# Patient Record
Sex: MALE | Race: WHITE | NOT HISPANIC OR LATINO | URBAN - METROPOLITAN AREA
[De-identification: names, ages, dates, MRNs, and addresses within clinical notes are randomized per-mention and may not be internally consistent; named-entity substitution may affect disease eponyms.]

---

## 2018-04-27 ENCOUNTER — EMERGENCY (EMERGENCY)
Facility: HOSPITAL | Age: 53
LOS: 1 days | Discharge: HOME | End: 2018-04-27
Attending: EMERGENCY MEDICINE
Payer: SELF-PAY

## 2018-04-27 VITALS — HEIGHT: 69 IN | WEIGHT: 220.02 LBS

## 2018-04-27 VITALS
RESPIRATION RATE: 18 BRPM | OXYGEN SATURATION: 96 % | DIASTOLIC BLOOD PRESSURE: 81 MMHG | SYSTOLIC BLOOD PRESSURE: 125 MMHG | TEMPERATURE: 97 F | HEART RATE: 86 BPM

## 2018-04-27 DIAGNOSIS — Z98.89 OTHER SPECIFIED POSTPROCEDURAL STATES: Chronic | ICD-10-CM

## 2018-04-27 DIAGNOSIS — K46.9 UNSPECIFIED ABDOMINAL HERNIA WITHOUT OBSTRUCTION OR GANGRENE: Chronic | ICD-10-CM

## 2018-04-27 PROCEDURE — 99283 EMERGENCY DEPT VISIT LOW MDM: CPT

## 2018-04-27 PROCEDURE — 73110 X-RAY EXAM OF WRIST: CPT

## 2018-04-27 PROCEDURE — 73110 X-RAY EXAM OF WRIST: CPT | Mod: 26,RT

## 2018-04-27 NOTE — ED ADULT NURSE NOTE - OBJECTIVE STATEMENT
Patient states that he has been having pain in his right arm for the last 10 days. Patient states that he twisted It at work and is still having pain. Patient has no obvious injuries noted

## 2018-04-27 NOTE — ED STATDOCS - CARE PLAN
Principal Discharge DX:	Wrist pain, acute, right  Assessment and plan of treatment:	Apply ice to affected area for 15-20 minutes every few hours for the next few days.  Use as much or as frequently as desired. Tylenol extra strength 2 tablets every 4 hours or Ibuprofen 600mg (3 tablets) every 6 hours as needed for aches, pains.

## 2018-04-27 NOTE — ED STATDOCS - PLAN OF CARE
Apply ice to affected area for 15-20 minutes every few hours for the next few days.  Use as much or as frequently as desired. Tylenol extra strength 2 tablets every 4 hours or Ibuprofen 600mg (3 tablets) every 6 hours as needed for aches, pains.

## 2018-04-27 NOTE — ED STATDOCS - PMH
Afib  2015  Broken ribs  left posterior 9th and 10th  Chronic sinusitis    DDD (degenerative disc disease), lumbar    [place] (hard of hearing), bilateral    Lung nodules  watching  Renal calculus  current  Sleep apnea  not using c-pap  Ulcerative colitis

## 2018-04-27 NOTE — ED ADULT NURSE NOTE - CHPI ED SYMPTOMS NEG
no bruising/no fever/no stiffness/no numbness/no deformity/no difficulty bearing weight/no tingling/no weakness/no abrasion

## 2018-05-18 ENCOUNTER — EMERGENCY (EMERGENCY)
Facility: HOSPITAL | Age: 53
LOS: 1 days | Discharge: HOME | End: 2018-05-18
Attending: EMERGENCY MEDICINE
Payer: COMMERCIAL

## 2018-05-18 VITALS
HEART RATE: 87 BPM | OXYGEN SATURATION: 95 % | RESPIRATION RATE: 20 BRPM | TEMPERATURE: 98 F | SYSTOLIC BLOOD PRESSURE: 119 MMHG | DIASTOLIC BLOOD PRESSURE: 80 MMHG

## 2018-05-18 VITALS — WEIGHT: 220.02 LBS | HEIGHT: 69 IN

## 2018-05-18 DIAGNOSIS — Z98.89 OTHER SPECIFIED POSTPROCEDURAL STATES: Chronic | ICD-10-CM

## 2018-05-18 DIAGNOSIS — K46.9 UNSPECIFIED ABDOMINAL HERNIA WITHOUT OBSTRUCTION OR GANGRENE: Chronic | ICD-10-CM

## 2018-05-18 PROCEDURE — 72100 X-RAY EXAM L-S SPINE 2/3 VWS: CPT

## 2018-05-18 PROCEDURE — 72100 X-RAY EXAM L-S SPINE 2/3 VWS: CPT | Mod: 26

## 2018-05-18 PROCEDURE — 99283 EMERGENCY DEPT VISIT LOW MDM: CPT | Mod: 25

## 2018-05-18 PROCEDURE — 96372 THER/PROPH/DIAG INJ SC/IM: CPT

## 2018-05-18 PROCEDURE — 99283 EMERGENCY DEPT VISIT LOW MDM: CPT

## 2018-05-18 RX ORDER — IBUPROFEN 200 MG
1 TABLET ORAL
Qty: 15 | Refills: 0 | OUTPATIENT
Start: 2018-05-18 | End: 2018-05-22

## 2018-05-18 RX ORDER — CYCLOBENZAPRINE HYDROCHLORIDE 10 MG/1
1 TABLET, FILM COATED ORAL
Qty: 10 | Refills: 0 | OUTPATIENT
Start: 2018-05-18 | End: 2018-05-22

## 2018-05-18 RX ORDER — DEXAMETHASONE 0.5 MG/5ML
10 ELIXIR ORAL ONCE
Qty: 0 | Refills: 0 | Status: COMPLETED | OUTPATIENT
Start: 2018-05-18 | End: 2018-05-18

## 2018-05-18 RX ORDER — IBUPROFEN 200 MG
800 TABLET ORAL ONCE
Qty: 0 | Refills: 0 | Status: COMPLETED | OUTPATIENT
Start: 2018-05-18 | End: 2018-05-18

## 2018-05-18 RX ADMIN — Medication 10 MILLIGRAM(S): at 21:28

## 2018-05-18 RX ADMIN — Medication 800 MILLIGRAM(S): at 21:28

## 2018-05-18 NOTE — ED PROVIDER NOTE - MEDICAL DECISION MAKING DETAILS
51 y/o M p/w lower back pain concerning for disc herniation with sciatica. Will give NSAIDs, steroids and re-eval.

## 2018-05-18 NOTE — ED PROVIDER NOTE - PMH
Afib  2015  Broken ribs  left posterior 9th and 10th  Chronic sinusitis    DDD (degenerative disc disease), lumbar    [place] (hard of hearing), bilateral    Lung nodules  watching  Renal calculus  current  Sleep apnea  not using c-pap  Ulcerative colitis

## 2018-05-18 NOTE — ED PROVIDER NOTE - OBJECTIVE STATEMENT
51 y/o M w/ PMHx of Afib, UC, and OA presents to ED c/o R lower back pain with mild radiation to the RLE x1 month. Associated numbness to the RLE. Denies fall/injury/trauma, urinary incontinence, bowel incontinence, urinary sx, fever, chills, motor/sensory loss or any other complaints at this time.

## 2022-03-16 NOTE — ED ADULT TRIAGE NOTE - HEIGHT IN FEET
5 Star Wedge Flap Text: The defect edges were debeveled with a #15 scalpel blade.  Given the location of the defect, shape of the defect and the proximity to free margins a star wedge flap was deemed most appropriate.  Using a sterile surgical marker, an appropriate rotation flap was drawn incorporating the defect and placing the expected incisions within the relaxed skin tension lines where possible. The area thus outlined was incised deep to adipose tissue with a #15 scalpel blade.  The skin margins were undermined to an appropriate distance in all directions utilizing iris scissors.

## 2023-11-29 ENCOUNTER — OFFICE (OUTPATIENT)
Dept: URBAN - METROPOLITAN AREA CLINIC 113 | Facility: CLINIC | Age: 58
Setting detail: OPHTHALMOLOGY
End: 2023-11-29
Payer: COMMERCIAL

## 2023-11-29 DIAGNOSIS — H40.013: ICD-10-CM

## 2023-11-29 PROCEDURE — 92250 FUNDUS PHOTOGRAPHY W/I&R: CPT | Performed by: STUDENT IN AN ORGANIZED HEALTH CARE EDUCATION/TRAINING PROGRAM

## 2023-11-29 PROCEDURE — 92004 COMPRE OPH EXAM NEW PT 1/>: CPT | Performed by: STUDENT IN AN ORGANIZED HEALTH CARE EDUCATION/TRAINING PROGRAM

## 2023-11-29 ASSESSMENT — REFRACTION_CURRENTRX
OS_AXIS: 147
OS_ADD: +2.75
OS_SPHERE: +1.00
OD_AXIS: 061
OD_VPRISM_DIRECTION: BF
OS_OVR_VA: 20/
OS_VPRISM_DIRECTION: BF
OD_OVR_VA: 20/
OD_SPHERE: +0.75
OD_ADD: +2.75
OS_CYLINDER: -0.50
OD_CYLINDER: -0.75

## 2023-11-29 ASSESSMENT — REFRACTION_AUTOREFRACTION
OS_SPHERE: +0.50
OD_SPHERE: +0.50
OS_AXIS: 000
OD_AXIS: 027
OS_CYLINDER: SPHERE
OD_CYLINDER: -0.75

## 2023-11-29 ASSESSMENT — CONFRONTATIONAL VISUAL FIELD TEST (CVF)
OS_FINDINGS: FULL
OD_FINDINGS: FULL

## 2023-11-29 ASSESSMENT — SPHEQUIV_DERIVED: OD_SPHEQUIV: 0.125

## 2024-01-17 ENCOUNTER — OFFICE (OUTPATIENT)
Dept: URBAN - METROPOLITAN AREA CLINIC 113 | Facility: CLINIC | Age: 59
Setting detail: OPHTHALMOLOGY
End: 2024-01-17
Payer: COMMERCIAL

## 2024-01-17 DIAGNOSIS — H40.013: ICD-10-CM

## 2024-01-17 PROCEDURE — 92083 EXTENDED VISUAL FIELD XM: CPT | Performed by: STUDENT IN AN ORGANIZED HEALTH CARE EDUCATION/TRAINING PROGRAM

## 2024-01-17 PROCEDURE — 92020 GONIOSCOPY: CPT | Performed by: STUDENT IN AN ORGANIZED HEALTH CARE EDUCATION/TRAINING PROGRAM

## 2024-01-17 PROCEDURE — 92133 CPTRZD OPH DX IMG PST SGM ON: CPT | Performed by: STUDENT IN AN ORGANIZED HEALTH CARE EDUCATION/TRAINING PROGRAM

## 2024-01-17 PROCEDURE — 99213 OFFICE O/P EST LOW 20 MIN: CPT | Performed by: STUDENT IN AN ORGANIZED HEALTH CARE EDUCATION/TRAINING PROGRAM

## 2024-01-17 ASSESSMENT — REFRACTION_CURRENTRX
OD_VPRISM_DIRECTION: BF
OD_SPHERE: +0.75
OD_AXIS: 061
OS_AXIS: 147
OS_SPHERE: +1.00
OS_OVR_VA: 20/
OD_ADD: +2.75
OS_VPRISM_DIRECTION: BF
OS_ADD: +2.75
OD_CYLINDER: -0.75
OS_CYLINDER: -0.50
OD_OVR_VA: 20/

## 2024-01-17 ASSESSMENT — SPHEQUIV_DERIVED
OS_SPHEQUIV: 0.625
OD_SPHEQUIV: 0.375

## 2024-01-17 ASSESSMENT — REFRACTION_AUTOREFRACTION
OS_SPHERE: +0.75
OS_AXIS: 027
OD_AXIS: 034
OD_SPHERE: +0.75
OD_CYLINDER: -0.75
OS_CYLINDER: -0.25

## 2024-01-17 ASSESSMENT — CONFRONTATIONAL VISUAL FIELD TEST (CVF)
OS_FINDINGS: FULL
OD_FINDINGS: FULL

## 2024-04-01 ENCOUNTER — APPOINTMENT (OUTPATIENT)
Dept: GASTROENTEROLOGY | Facility: CLINIC | Age: 59
End: 2024-04-01
Payer: MEDICARE

## 2024-04-01 VITALS
BODY MASS INDEX: 35.63 KG/M2 | HEIGHT: 67 IN | TEMPERATURE: 97.5 F | WEIGHT: 227 LBS | HEART RATE: 95 BPM | OXYGEN SATURATION: 99 % | DIASTOLIC BLOOD PRESSURE: 92 MMHG | SYSTOLIC BLOOD PRESSURE: 131 MMHG | RESPIRATION RATE: 14 BRPM

## 2024-04-01 DIAGNOSIS — Z78.9 OTHER SPECIFIED HEALTH STATUS: ICD-10-CM

## 2024-04-01 DIAGNOSIS — Z86.79 PERSONAL HISTORY OF OTHER DISEASES OF THE CIRCULATORY SYSTEM: ICD-10-CM

## 2024-04-01 DIAGNOSIS — R73.03 PREDIABETES.: ICD-10-CM

## 2024-04-01 PROCEDURE — 99204 OFFICE O/P NEW MOD 45 MIN: CPT

## 2024-04-01 RX ORDER — MESALAMINE 500 MG/1
500 CAPSULE, EXTENDED RELEASE ORAL
Refills: 0 | Status: ACTIVE | COMMUNITY

## 2024-04-01 RX ORDER — TORSEMIDE 20 MG/1
20 TABLET ORAL
Refills: 0 | Status: ACTIVE | COMMUNITY

## 2024-04-01 RX ORDER — ROSUVASTATIN CALCIUM 5 MG/1
TABLET, FILM COATED ORAL
Refills: 0 | Status: ACTIVE | COMMUNITY

## 2024-04-01 RX ORDER — OMEPRAZOLE 40 MG/1
CAPSULE, DELAYED RELEASE ORAL
Refills: 0 | Status: ACTIVE | COMMUNITY

## 2024-04-01 RX ORDER — DILTIAZEM HYDROCHLORIDE 240 MG/1
240 CAPSULE, EXTENDED RELEASE ORAL
Refills: 0 | Status: ACTIVE | COMMUNITY

## 2024-04-01 RX ORDER — PREDNISONE 5 MG/1
5 TABLET ORAL
Refills: 0 | Status: ACTIVE | COMMUNITY

## 2024-04-01 RX ORDER — NITROFURANTOIN (MONOHYDRATE/MACROCRYSTALS) 25; 75 MG/1; MG/1
100 CAPSULE ORAL
Refills: 0 | Status: ACTIVE | COMMUNITY

## 2024-04-01 RX ORDER — TAMSULOSIN HYDROCHLORIDE 0.4 MG/1
0.4 CAPSULE ORAL
Refills: 0 | Status: ACTIVE | COMMUNITY

## 2024-04-01 RX ORDER — MONTELUKAST SODIUM 10 MG/1
10 TABLET, FILM COATED ORAL
Refills: 0 | Status: ACTIVE | COMMUNITY

## 2024-04-01 RX ORDER — CYANOCOBALAMIN (VITAMIN B-12) 1000 MCG
TABLET ORAL
Refills: 0 | Status: ACTIVE | COMMUNITY

## 2024-04-01 RX ORDER — DOXYCYCLINE HYCLATE 100 MG/1
100 CAPSULE ORAL
Refills: 0 | Status: ACTIVE | COMMUNITY

## 2024-04-01 RX ORDER — APIXABAN 5 MG/1
5 TABLET, FILM COATED ORAL
Refills: 0 | Status: ACTIVE | COMMUNITY

## 2024-04-01 RX ORDER — DIGOXIN 125 UG/1
125 TABLET ORAL
Refills: 0 | Status: ACTIVE | COMMUNITY

## 2024-04-01 RX ORDER — MIDODRINE HYDROCHLORIDE 5 MG/1
5 TABLET ORAL
Refills: 0 | Status: ACTIVE | COMMUNITY

## 2024-04-01 NOTE — PHYSICAL EXAM
[Alert] : alert [Normal Voice/Communication] : normal voice/communication [Healthy Appearing] : healthy appearing [No Acute Distress] : no acute distress [Sclera] : the sclera and conjunctiva were normal [Hearing Threshold Finger Rub Not [place]] : hearing was normal [Normal Lips/Gums] : the lips and gums were normal [Normal Appearance] : the appearance of the neck was normal [Oropharynx] : the oropharynx was normal [No Neck Mass] : no neck mass was observed [No Respiratory Distress] : no respiratory distress [No Acc Muscle Use] : no accessory muscle use [Respiration, Rhythm And Depth] : normal respiratory rhythm and effort [Heart Rate And Rhythm] : heart rate was normal and rhythm regular [Auscultation Breath Sounds / Voice Sounds] : lungs were clear to auscultation bilaterally [Normal S1, S2] : normal S1 and S2 [Murmurs] : no murmurs [Bowel Sounds] : normal bowel sounds [No Masses] : no abdominal mass palpated [Abdomen Tenderness] : non-tender [Abdomen Soft] : soft [] : no hepatosplenomegaly [Oriented To Time, Place, And Person] : oriented to person, place, and time

## 2024-04-01 NOTE — HISTORY OF PRESENT ILLNESS
[FreeTextEntry1] : The patient was referred by Dr. Elias.  The patient has undergone a CT abdomen and pelvis due to hematuria.  The patient has noted to have a pancreatic tail lesion about 1 cm in size.  Also noted to have a large calculus in the urinary bladder.  Patient has history of ulcerative colitis as well.  He has been on steroids. He is getting ablation performed by his cardiologist. Bipin lerma.  No smoking. No pancreatic cancer in family. No pancreatitis. No weight loss. No nausea, vomiting or abdominal pain.

## 2024-04-01 NOTE — ASSESSMENT
[FreeTextEntry1] : As the patient is currently undergoing cardiac ablation for underlying A-fib.  I will review his imaging with our radiology team and based on that we will decide the patient will need an urgent EUS on March.  In the meanwhile we will tentatively schedule him for the procedure pending his cardiology clearance.  Blood work was ordered.  Angel Luis Daniels MD Gastroenterology

## 2024-04-24 ENCOUNTER — RESULT REVIEW (OUTPATIENT)
Age: 59
End: 2024-04-24

## 2024-04-24 ENCOUNTER — APPOINTMENT (OUTPATIENT)
Dept: NUCLEAR MEDICINE | Facility: CLINIC | Age: 59
End: 2024-04-24
Payer: MEDICARE

## 2024-04-24 PROCEDURE — A9592: CPT

## 2024-04-24 PROCEDURE — 78815 PET IMAGE W/CT SKULL-THIGH: CPT | Mod: PI

## 2024-05-06 ENCOUNTER — LABORATORY RESULT (OUTPATIENT)
Age: 59
End: 2024-05-06

## 2024-05-13 LAB
ALBUMIN SERPL ELPH-MCNC: 4.1 G/DL
ALP BLD-CCNC: 74 U/L
ALT SERPL-CCNC: 16 U/L
ANION GAP SERPL CALC-SCNC: 12 MMOL/L
AST SERPL-CCNC: 19 U/L
BASOPHILS # BLD AUTO: 0.16 K/UL
BASOPHILS NFR BLD AUTO: 1.6 %
BILIRUB SERPL-MCNC: 0.4 MG/DL
BUN SERPL-MCNC: 9 MG/DL
CALCIUM SERPL-MCNC: 9.7 MG/DL
CANCER AG19-9 SERPL-ACNC: 6 U/ML
CEA SERPL-MCNC: 1.1 NG/ML
CGA SERPL-MCNC: 81.5 NG/ML
CHLORIDE SERPL-SCNC: 105 MMOL/L
CO2 SERPL-SCNC: 24 MMOL/L
CREAT SERPL-MCNC: 0.95 MG/DL
EGFR: 92 ML/MIN/1.73M2
EOSINOPHIL # BLD AUTO: 1.62 K/UL
EOSINOPHIL NFR BLD AUTO: 16.4 %
GLUCOSE SERPL-MCNC: 98 MG/DL
HCT VFR BLD CALC: 38.9 %
HGB BLD-MCNC: 11.9 G/DL
IMM GRANULOCYTES NFR BLD AUTO: 0.4 %
LYMPHOCYTES # BLD AUTO: 2.65 K/UL
LYMPHOCYTES NFR BLD AUTO: 26.8 %
MAN DIFF?: NORMAL
MCHC RBC-ENTMCNC: 27.9 PG
MCHC RBC-ENTMCNC: 30.6 GM/DL
MCV RBC AUTO: 91.1 FL
MONOCYTES # BLD AUTO: 1.76 K/UL
MONOCYTES NFR BLD AUTO: 17.8 %
NEUTROPHILS # BLD AUTO: 3.66 K/UL
NEUTROPHILS NFR BLD AUTO: 37 %
PLATELET # BLD AUTO: 441 K/UL
POTASSIUM SERPL-SCNC: 4.3 MMOL/L
PROT SERPL-MCNC: 7.3 G/DL
RBC # BLD: 4.27 M/UL
RBC # FLD: 17.1 %
SODIUM SERPL-SCNC: 142 MMOL/L
WBC # FLD AUTO: 9.89 K/UL

## 2024-06-05 ENCOUNTER — TRANSCRIPTION ENCOUNTER (OUTPATIENT)
Age: 59
End: 2024-06-05

## 2024-06-06 ENCOUNTER — APPOINTMENT (OUTPATIENT)
Dept: GASTROENTEROLOGY | Facility: HOSPITAL | Age: 59
End: 2024-06-06

## 2024-06-06 ENCOUNTER — OUTPATIENT (OUTPATIENT)
Dept: OUTPATIENT SERVICES | Facility: HOSPITAL | Age: 59
LOS: 1 days | End: 2024-06-06
Payer: MEDICARE

## 2024-06-06 ENCOUNTER — RESULT REVIEW (OUTPATIENT)
Age: 59
End: 2024-06-06

## 2024-06-06 DIAGNOSIS — K86.9 DISEASE OF PANCREAS, UNSPECIFIED: ICD-10-CM

## 2024-06-06 DIAGNOSIS — K46.9 UNSPECIFIED ABDOMINAL HERNIA WITHOUT OBSTRUCTION OR GANGRENE: Chronic | ICD-10-CM

## 2024-06-06 DIAGNOSIS — Z98.89 OTHER SPECIFIED POSTPROCEDURAL STATES: Chronic | ICD-10-CM

## 2024-06-06 PROCEDURE — 43242 EGD US FINE NEEDLE BX/ASPIR: CPT

## 2024-06-06 PROCEDURE — C9399: CPT

## 2024-06-06 PROCEDURE — 88342 IMHCHEM/IMCYTCHM 1ST ANTB: CPT | Mod: 26

## 2024-06-06 PROCEDURE — 43239 EGD BIOPSY SINGLE/MULTIPLE: CPT | Mod: XS

## 2024-06-06 PROCEDURE — 43239 EGD BIOPSY SINGLE/MULTIPLE: CPT | Mod: 59

## 2024-06-06 PROCEDURE — 88305 TISSUE EXAM BY PATHOLOGIST: CPT

## 2024-06-06 PROCEDURE — 88342 IMHCHEM/IMCYTCHM 1ST ANTB: CPT

## 2024-06-06 PROCEDURE — 88173 CYTOPATH EVAL FNA REPORT: CPT | Mod: 26

## 2024-06-06 PROCEDURE — 88305 TISSUE EXAM BY PATHOLOGIST: CPT | Mod: 26

## 2024-06-06 NOTE — PHYSICAL EXAM
[Alert] : alert [Normal Voice/Communication] : normal voice/communication [Healthy Appearing] : healthy appearing [No Acute Distress] : no acute distress [Sclera] : the sclera and conjunctiva were normal [Hearing Threshold Finger Rub Not [place]] : hearing was normal [Normal Lips/Gums] : the lips and gums were normal [Oropharynx] : the oropharynx was normal [Normal Appearance] : the appearance of the neck was normal [No Neck Mass] : no neck mass was observed [No Respiratory Distress] : no respiratory distress [No Acc Muscle Use] : no accessory muscle use [Respiration, Rhythm And Depth] : normal respiratory rhythm and effort [Auscultation Breath Sounds / Voice Sounds] : lungs were clear to auscultation bilaterally [Heart Rate And Rhythm] : heart rate was normal and rhythm regular [Normal S1, S2] : normal S1 and S2 [Murmurs] : no murmurs [Bowel Sounds] : normal bowel sounds [Abdomen Tenderness] : non-tender [No Masses] : no abdominal mass palpated [Abdomen Soft] : soft [] : no hepatosplenomegaly [Oriented To Time, Place, And Person] : oriented to person, place, and time

## 2024-06-06 NOTE — HISTORY OF PRESENT ILLNESS
[FreeTextEntry1] : Patient has arrived for EGD.EUS. He has questionable pancreatic tail lesion. On dotate PET scan, there is uptake in the tail area. MRI was equivocal. Cardiology clearance was obtained.

## 2024-06-10 LAB — SURGICAL PATHOLOGY STUDY: SIGNIFICANT CHANGE UP

## 2024-06-11 LAB — NON-GYNECOLOGICAL CYTOLOGY STUDY: SIGNIFICANT CHANGE UP

## 2024-06-29 ENCOUNTER — EMERGENCY (EMERGENCY)
Facility: HOSPITAL | Age: 59
LOS: 1 days | Discharge: HOME | End: 2024-06-29
Attending: EMERGENCY MEDICINE
Payer: MEDICARE

## 2024-06-29 VITALS
SYSTOLIC BLOOD PRESSURE: 126 MMHG | OXYGEN SATURATION: 95 % | TEMPERATURE: 98 F | WEIGHT: 209.88 LBS | HEART RATE: 94 BPM | RESPIRATION RATE: 18 BRPM | DIASTOLIC BLOOD PRESSURE: 83 MMHG

## 2024-06-29 VITALS
HEART RATE: 96 BPM | OXYGEN SATURATION: 95 % | SYSTOLIC BLOOD PRESSURE: 121 MMHG | RESPIRATION RATE: 18 BRPM | DIASTOLIC BLOOD PRESSURE: 81 MMHG

## 2024-06-29 DIAGNOSIS — K46.9 UNSPECIFIED ABDOMINAL HERNIA WITHOUT OBSTRUCTION OR GANGRENE: Chronic | ICD-10-CM

## 2024-06-29 DIAGNOSIS — Z98.89 OTHER SPECIFIED POSTPROCEDURAL STATES: Chronic | ICD-10-CM

## 2024-06-29 LAB
ALBUMIN SERPL ELPH-MCNC: 4.1 G/DL — SIGNIFICANT CHANGE UP (ref 3.3–5.2)
ALP SERPL-CCNC: 77 U/L — SIGNIFICANT CHANGE UP (ref 40–120)
ALT FLD-CCNC: 15 U/L — SIGNIFICANT CHANGE UP
ANION GAP SERPL CALC-SCNC: 11 MMOL/L — SIGNIFICANT CHANGE UP (ref 5–17)
APPEARANCE UR: ABNORMAL
AST SERPL-CCNC: 19 U/L — SIGNIFICANT CHANGE UP
BACTERIA # UR AUTO: NEGATIVE /HPF — SIGNIFICANT CHANGE UP
BASOPHILS # BLD AUTO: 0.08 K/UL — SIGNIFICANT CHANGE UP (ref 0–0.2)
BASOPHILS NFR BLD AUTO: 0.5 % — SIGNIFICANT CHANGE UP (ref 0–2)
BILIRUB SERPL-MCNC: 0.7 MG/DL — SIGNIFICANT CHANGE UP (ref 0.4–2)
BILIRUB UR-MCNC: NEGATIVE — SIGNIFICANT CHANGE UP
BUN SERPL-MCNC: 20.4 MG/DL — HIGH (ref 8–20)
CALCIUM SERPL-MCNC: 9.3 MG/DL — SIGNIFICANT CHANGE UP (ref 8.4–10.5)
CAST: 1 /LPF — SIGNIFICANT CHANGE UP (ref 0–4)
CHLORIDE SERPL-SCNC: 91 MMOL/L — LOW (ref 96–108)
CO2 SERPL-SCNC: 34 MMOL/L — HIGH (ref 22–29)
COLOR SPEC: YELLOW — SIGNIFICANT CHANGE UP
CREAT SERPL-MCNC: 0.87 MG/DL — SIGNIFICANT CHANGE UP (ref 0.5–1.3)
DIFF PNL FLD: ABNORMAL
EGFR: 99 ML/MIN/1.73M2 — SIGNIFICANT CHANGE UP
EOSINOPHIL # BLD AUTO: 0.69 K/UL — HIGH (ref 0–0.5)
EOSINOPHIL NFR BLD AUTO: 4.6 % — SIGNIFICANT CHANGE UP (ref 0–6)
GLUCOSE SERPL-MCNC: 130 MG/DL — HIGH (ref 70–99)
GLUCOSE UR QL: NEGATIVE MG/DL — SIGNIFICANT CHANGE UP
HCT VFR BLD CALC: 45.6 % — SIGNIFICANT CHANGE UP (ref 39–50)
HGB BLD-MCNC: 15 G/DL — SIGNIFICANT CHANGE UP (ref 13–17)
IMM GRANULOCYTES NFR BLD AUTO: 0.3 % — SIGNIFICANT CHANGE UP (ref 0–0.9)
KETONES UR-MCNC: NEGATIVE MG/DL — SIGNIFICANT CHANGE UP
LEUKOCYTE ESTERASE UR-ACNC: ABNORMAL
LYMPHOCYTES # BLD AUTO: 13.5 % — SIGNIFICANT CHANGE UP (ref 13–44)
LYMPHOCYTES # BLD AUTO: 2.04 K/UL — SIGNIFICANT CHANGE UP (ref 1–3.3)
MCHC RBC-ENTMCNC: 28.5 PG — SIGNIFICANT CHANGE UP (ref 27–34)
MCHC RBC-ENTMCNC: 32.9 GM/DL — SIGNIFICANT CHANGE UP (ref 32–36)
MCV RBC AUTO: 86.5 FL — SIGNIFICANT CHANGE UP (ref 80–100)
MONOCYTES # BLD AUTO: 1.94 K/UL — HIGH (ref 0–0.9)
MONOCYTES NFR BLD AUTO: 12.8 % — SIGNIFICANT CHANGE UP (ref 2–14)
NEUTROPHILS # BLD AUTO: 10.36 K/UL — HIGH (ref 1.8–7.4)
NEUTROPHILS NFR BLD AUTO: 68.3 % — SIGNIFICANT CHANGE UP (ref 43–77)
NITRITE UR-MCNC: NEGATIVE — SIGNIFICANT CHANGE UP
PH UR: 7 — SIGNIFICANT CHANGE UP (ref 5–8)
PLATELET # BLD AUTO: 450 K/UL — HIGH (ref 150–400)
POTASSIUM SERPL-MCNC: 2.9 MMOL/L — CRITICAL LOW (ref 3.5–5.3)
POTASSIUM SERPL-SCNC: 2.9 MMOL/L — CRITICAL LOW (ref 3.5–5.3)
PROT SERPL-MCNC: 8.4 G/DL — SIGNIFICANT CHANGE UP (ref 6.6–8.7)
PROT UR-MCNC: 100 MG/DL
RBC # BLD: 5.27 M/UL — SIGNIFICANT CHANGE UP (ref 4.2–5.8)
RBC # FLD: 15.8 % — HIGH (ref 10.3–14.5)
RBC CASTS # UR COMP ASSIST: 81 /HPF — HIGH (ref 0–4)
SODIUM SERPL-SCNC: 136 MMOL/L — SIGNIFICANT CHANGE UP (ref 135–145)
SP GR SPEC: 1.01 — SIGNIFICANT CHANGE UP (ref 1–1.03)
SQUAMOUS # UR AUTO: 1 /HPF — SIGNIFICANT CHANGE UP (ref 0–5)
UROBILINOGEN FLD QL: 0.2 MG/DL — SIGNIFICANT CHANGE UP (ref 0.2–1)
WBC # BLD: 15.16 K/UL — HIGH (ref 3.8–10.5)
WBC # FLD AUTO: 15.16 K/UL — HIGH (ref 3.8–10.5)
WBC UR QL: 194 /HPF — HIGH (ref 0–5)

## 2024-06-29 PROCEDURE — 80053 COMPREHEN METABOLIC PANEL: CPT

## 2024-06-29 PROCEDURE — 93010 ELECTROCARDIOGRAM REPORT: CPT

## 2024-06-29 PROCEDURE — 36415 COLL VENOUS BLD VENIPUNCTURE: CPT

## 2024-06-29 PROCEDURE — 96374 THER/PROPH/DIAG INJ IV PUSH: CPT

## 2024-06-29 PROCEDURE — 99284 EMERGENCY DEPT VISIT MOD MDM: CPT

## 2024-06-29 PROCEDURE — 85025 COMPLETE CBC W/AUTO DIFF WBC: CPT

## 2024-06-29 PROCEDURE — 81001 URINALYSIS AUTO W/SCOPE: CPT

## 2024-06-29 PROCEDURE — 87086 URINE CULTURE/COLONY COUNT: CPT

## 2024-06-29 PROCEDURE — 96375 TX/PRO/DX INJ NEW DRUG ADDON: CPT

## 2024-06-29 PROCEDURE — 99284 EMERGENCY DEPT VISIT MOD MDM: CPT | Mod: 25

## 2024-06-29 PROCEDURE — 93005 ELECTROCARDIOGRAM TRACING: CPT

## 2024-06-29 RX ORDER — CEFTRIAXONE SODIUM 500 MG
1000 VIAL (EA) INJECTION ONCE
Refills: 0 | Status: COMPLETED | OUTPATIENT
Start: 2024-06-29 | End: 2024-06-29

## 2024-06-29 RX ORDER — CEFPODOXIME PROXETIL 50 MG/5 ML
1 SUSPENSION, RECONSTITUTED, ORAL (ML) ORAL
Qty: 14 | Refills: 0
Start: 2024-06-29 | End: 2024-07-05

## 2024-06-29 RX ORDER — POTASSIUM CHLORIDE 600 MG/1
1 TABLET, FILM COATED, EXTENDED RELEASE ORAL
Qty: 7 | Refills: 0
Start: 2024-06-29 | End: 2024-07-05

## 2024-06-29 RX ORDER — POTASSIUM CHLORIDE 600 MG/1
40 TABLET, FILM COATED, EXTENDED RELEASE ORAL ONCE
Refills: 0 | Status: COMPLETED | OUTPATIENT
Start: 2024-06-29 | End: 2024-06-29

## 2024-06-29 RX ORDER — POTASSIUM CHLORIDE 600 MG/1
10 TABLET, FILM COATED, EXTENDED RELEASE ORAL ONCE
Refills: 0 | Status: COMPLETED | OUTPATIENT
Start: 2024-06-29 | End: 2024-06-29

## 2024-06-29 RX ADMIN — POTASSIUM CHLORIDE 40 MILLIEQUIVALENT(S): 600 TABLET, FILM COATED, EXTENDED RELEASE ORAL at 12:04

## 2024-06-29 RX ADMIN — Medication 1000 MILLIGRAM(S): at 12:03

## 2024-06-29 RX ADMIN — POTASSIUM CHLORIDE 100 MILLIEQUIVALENT(S): 600 TABLET, FILM COATED, EXTENDED RELEASE ORAL at 12:03

## 2024-06-30 LAB
CULTURE RESULTS: SIGNIFICANT CHANGE UP
SPECIMEN SOURCE: SIGNIFICANT CHANGE UP

## 2024-09-15 ENCOUNTER — NON-APPOINTMENT (OUTPATIENT)
Age: 59
End: 2024-09-15

## 2024-09-18 ENCOUNTER — APPOINTMENT (OUTPATIENT)
Dept: GASTROENTEROLOGY | Facility: CLINIC | Age: 59
End: 2024-09-18
Payer: MEDICARE

## 2024-09-18 VITALS
HEART RATE: 88 BPM | RESPIRATION RATE: 14 BRPM | HEIGHT: 67 IN | BODY MASS INDEX: 34.21 KG/M2 | WEIGHT: 218 LBS | OXYGEN SATURATION: 98 % | DIASTOLIC BLOOD PRESSURE: 80 MMHG | SYSTOLIC BLOOD PRESSURE: 134 MMHG

## 2024-09-18 DIAGNOSIS — Z09 ENCOUNTER FOR FOLLOW-UP EXAMINATION AFTER COMPLETED TREATMENT FOR CONDITIONS OTHER THAN MALIGNANT NEOPLASM: ICD-10-CM

## 2024-09-18 DIAGNOSIS — K86.9 DISEASE OF PANCREAS, UNSPECIFIED: ICD-10-CM

## 2024-09-18 PROCEDURE — G2211 COMPLEX E/M VISIT ADD ON: CPT

## 2024-09-18 PROCEDURE — 99214 OFFICE O/P EST MOD 30 MIN: CPT

## 2024-09-18 NOTE — PHYSICAL EXAM

## 2024-09-18 NOTE — HISTORY OF PRESENT ILLNESS
[FreeTextEntry1] : Patient has arrived after EGD.EUS. He has questionable pancreatic tail lesion. On dotate PET scan, there is uptake in the tail area. MRI was equivocal. Cardiology clearance was obtained.  On EGD he was noted to have a paraesophageal hiatal hernia.  On EUS there was a hypoechoic lesion in the tail of the pancreas measuring 0.76 cm x 0.57 cm.  FNA was inconclusive. [de-identified] :   EGD-EUS (Upper) Procedure Report    Date: 2024    Patient Name: LUIZA CARLISLE    MRN #: [id number]    Account Number:    [id number]    Endoscopist(s):    Angel Luis Daniels MD    Gender: Male     (age): 1965 (58)    EGD Instrument(s):    GIF [id number])    EUS (Upper) Instrument(s):    GF [id number])    Referring Physician:    Will Elias MD    [address] [place] [address]        ASA Class:    P3 - 2024 9:51 PM Angel Luis Daniels MD    History of Present Illness:    The patient was referred for evaluation for EGD/EUS for pancreatic tail lesion    Administered Medications:    As per Anesthesiology Record    EGD Indications:    Lesion of pancreas - K86.9    EUS Indications:    Lesion of pancreas - K86.9    General Procedure:    The procedure, indications, preparation and potential complications were  explained to the patient, who indicated understanding and signed the  corresponding consent forms. IV general anesthesia was administered. Continuous  pulse oximetry and blood pressure monitoring were used throughout the procedure.  Supplemental oxygen was used.    EGD    EGD Procedure:    Patient was placed in left lateral decubitus position. The endoscope was  introduced through mouth and advanced under direct visualization until second  part of the duodenum reached. Patient tolerance to the procedure was good. The  procedure was not difficult.    EGD Limitations/Complications:    There were no apparent limitations or complications    Endoscopy Findings:    The esophagus was examined from the upper sphincter to the lower esophageal  sphincter revealing no mucosal abnormalities. There was a 2 cm long  paraesophageal hiatal hernia was noted. GE junction was at 40 cm. Hiatus was  noted at 42 cm. The stomach was then examined in the forward and retroflexed  views revealing erythematous gastritis, biopsies were obtained. The duodenum was  then examined from the cap to the third portion revealing no mucosal  abnormalities. The major papilla appeared prominent but otherwise appeared  normal.    Endoscopic Ultrasound    EUS Procedure:    Patient was placed in left lateral decubitus position. The Linear Echoendoscope  was introduced through mouth and advanced under direct visualization of  esophagus, stomach, duodenum, pancreas and bile duct. Patient tolerance to the  procedure was excellent. The procedure was not difficult. Blood loss was  minimal.    EUS Limitations/Complications:    There were no apparent limitations or complications    Endoscopic Ultrasound Findings:    The gastroscope was then exchanged for the echoendoscope and the linear  echoendoscope was used to scan the pancreatic parenchyma. The pancreatic body  and tail were initially examined from the gastric body and fundus revealing a  hypoechoic lesion in the tail of the pancreas measuring 0.76 cm x 0.57 cm. A  normal PD from the tail to the neck of pancreas. The pancreatic head and  uncinate process were then examined from the duodenum revealing a no parenchymal  abnormalities and a normal PD. There was no evidence of chronic pancreatitis.  The common bile duct was also traced from the hilum to the ampulla, and measured  _cm, no stones were noted. The major papilla was normal with no stones. Views of  the left kidney, adrenal, spleen, liver lobes and portal vein were unremarkable.  Using the transgastric approach, 2 FNA passes were obtained from the pancreas  tail lesion. The scope was then removed.    Impressions:    Pancreatic lesion.    Hiatal hernia.    Plan:    Await pathology results.    Discharge to home    Patient will call office if any worrisome complaints, anticipatory guidance  discussed    Specimens:    Jar # 1 :    in the stomach antrum and stomach body    Test(s) requested: Histology    Jar # 2 :    Cytology in the Transgastric pancreas tail lesion    Test(s) requested: Cytology    Pathology:    Pathology was sent to lab, waiting for results    Angel Luis Daniels MD    Version 1, Electronically signed on 2024 10:05:19 PM by Angel Luis Daniels MD

## 2024-09-18 NOTE — ASSESSMENT
[FreeTextEntry1] : Pancreatic lesion: At this time the patient has no complaints.  We will follow-up with the MRI of the abdomen.  If there is no further increase in the size we will discuss with the surgical oncology team regarding the comanagement.  We will also review the imaging with the radiology team.  Discussed at length with the patient.  Follow-up after the MRI.  Angel Luis Daniels MD Gastroenterology

## 2024-12-02 ENCOUNTER — OFFICE (OUTPATIENT)
Dept: URBAN - METROPOLITAN AREA CLINIC 113 | Facility: CLINIC | Age: 59
Setting detail: OPHTHALMOLOGY
End: 2024-12-02
Payer: COMMERCIAL

## 2024-12-02 DIAGNOSIS — H52.7: ICD-10-CM

## 2024-12-02 DIAGNOSIS — H40.013: ICD-10-CM

## 2024-12-02 DIAGNOSIS — H25.13: ICD-10-CM

## 2024-12-02 PROCEDURE — 92133 CPTRZD OPH DX IMG PST SGM ON: CPT | Performed by: STUDENT IN AN ORGANIZED HEALTH CARE EDUCATION/TRAINING PROGRAM

## 2024-12-02 PROCEDURE — 92012 INTRM OPH EXAM EST PATIENT: CPT | Performed by: STUDENT IN AN ORGANIZED HEALTH CARE EDUCATION/TRAINING PROGRAM

## 2024-12-02 PROCEDURE — 92083 EXTENDED VISUAL FIELD XM: CPT | Performed by: STUDENT IN AN ORGANIZED HEALTH CARE EDUCATION/TRAINING PROGRAM

## 2024-12-02 PROCEDURE — 92015 DETERMINE REFRACTIVE STATE: CPT | Performed by: STUDENT IN AN ORGANIZED HEALTH CARE EDUCATION/TRAINING PROGRAM

## 2024-12-02 ASSESSMENT — REFRACTION_MANIFEST
OD_SPHERE: PLANO
OS_CYLINDER: SPH
OD_ADD: +2.75
OS_SPHERE: +0.25
OD_VA1: 20/20
OS_ADD: +2.75
OD_CYLINDER: SPH
OS_VA1: 20/20

## 2024-12-02 ASSESSMENT — KERATOMETRY
OD_K1POWER_DIOPTERS: 41.00
OS_AXISANGLE_DEGREES: 095
OS_K1POWER_DIOPTERS: 40.50
OD_AXISANGLE_DEGREES: 092
OS_K2POWER_DIOPTERS: 41.50
OD_K2POWER_DIOPTERS: 42.00

## 2024-12-02 ASSESSMENT — REFRACTION_AUTOREFRACTION
OD_AXIS: 065
OS_SPHERE: +0.25
OS_AXIS: 018
OD_SPHERE: PLANO
OS_CYLINDER: -0.50
OD_CYLINDER: -0.25

## 2024-12-02 ASSESSMENT — REFRACTION_CURRENTRX
OS_ADD: +2.75
OD_SPHERE: +0.75
OD_OVR_VA: 20/
OD_ADD: +2.75
OS_AXIS: 147
OD_VPRISM_DIRECTION: BF
OD_AXIS: 061
OS_OVR_VA: 20/
OD_CYLINDER: -0.75
OS_VPRISM_DIRECTION: BF
OS_SPHERE: +1.00
OS_CYLINDER: -0.50

## 2024-12-02 ASSESSMENT — TONOMETRY: OD_IOP_MMHG: 20

## 2024-12-02 ASSESSMENT — VISUAL ACUITY
OS_BCVA: 20/20-1
OD_BCVA: 20/20

## 2025-02-22 ENCOUNTER — EMERGENCY (EMERGENCY)
Facility: HOSPITAL | Age: 60
LOS: 1 days | Discharge: LEFT BEFORE DISCHARGE | End: 2025-02-22
Attending: EMERGENCY MEDICINE
Payer: MEDICARE

## 2025-02-22 VITALS
WEIGHT: 233.47 LBS | TEMPERATURE: 98 F | SYSTOLIC BLOOD PRESSURE: 150 MMHG | OXYGEN SATURATION: 96 % | RESPIRATION RATE: 20 BRPM | HEIGHT: 68 IN | DIASTOLIC BLOOD PRESSURE: 87 MMHG | HEART RATE: 93 BPM

## 2025-02-22 DIAGNOSIS — Z98.89 OTHER SPECIFIED POSTPROCEDURAL STATES: Chronic | ICD-10-CM

## 2025-02-22 DIAGNOSIS — K46.9 UNSPECIFIED ABDOMINAL HERNIA WITHOUT OBSTRUCTION OR GANGRENE: Chronic | ICD-10-CM

## 2025-02-22 LAB
ALBUMIN SERPL ELPH-MCNC: 3.7 G/DL — SIGNIFICANT CHANGE UP (ref 3.3–5.2)
ALP SERPL-CCNC: 59 U/L — SIGNIFICANT CHANGE UP (ref 40–120)
ALT FLD-CCNC: 17 U/L — SIGNIFICANT CHANGE UP
ANION GAP SERPL CALC-SCNC: 9 MMOL/L — SIGNIFICANT CHANGE UP (ref 5–17)
APTT BLD: 29.2 SEC — SIGNIFICANT CHANGE UP (ref 24.5–35.6)
AST SERPL-CCNC: 11 U/L — SIGNIFICANT CHANGE UP
BASOPHILS # BLD AUTO: 0.04 K/UL — SIGNIFICANT CHANGE UP (ref 0–0.2)
BASOPHILS NFR BLD AUTO: 0.3 % — SIGNIFICANT CHANGE UP (ref 0–2)
BILIRUB SERPL-MCNC: 0.3 MG/DL — LOW (ref 0.4–2)
BUN SERPL-MCNC: 17.5 MG/DL — SIGNIFICANT CHANGE UP (ref 8–20)
CALCIUM SERPL-MCNC: 9.4 MG/DL — SIGNIFICANT CHANGE UP (ref 8.4–10.5)
CHLORIDE SERPL-SCNC: 103 MMOL/L — SIGNIFICANT CHANGE UP (ref 96–108)
CO2 SERPL-SCNC: 28 MMOL/L — SIGNIFICANT CHANGE UP (ref 22–29)
CREAT SERPL-MCNC: 0.76 MG/DL — SIGNIFICANT CHANGE UP (ref 0.5–1.3)
EGFR: 102 ML/MIN/1.73M2 — SIGNIFICANT CHANGE UP
EGFR: 102 ML/MIN/1.73M2 — SIGNIFICANT CHANGE UP
EOSINOPHIL # BLD AUTO: 0.04 K/UL — SIGNIFICANT CHANGE UP (ref 0–0.5)
EOSINOPHIL NFR BLD AUTO: 0.3 % — SIGNIFICANT CHANGE UP (ref 0–6)
GLUCOSE SERPL-MCNC: 154 MG/DL — HIGH (ref 70–99)
HCT VFR BLD CALC: 35.6 % — LOW (ref 39–50)
HGB BLD-MCNC: 11 G/DL — LOW (ref 13–17)
IMM GRANULOCYTES # BLD AUTO: 0.19 K/UL — HIGH (ref 0–0.07)
IMM GRANULOCYTES NFR BLD AUTO: 1.6 % — HIGH (ref 0–0.9)
INR BLD: 0.96 RATIO — SIGNIFICANT CHANGE UP (ref 0.85–1.16)
LYMPHOCYTES # BLD AUTO: 2.36 K/UL — SIGNIFICANT CHANGE UP (ref 1–3.3)
LYMPHOCYTES NFR BLD AUTO: 19.4 % — SIGNIFICANT CHANGE UP (ref 13–44)
MCHC RBC-ENTMCNC: 27.6 PG — SIGNIFICANT CHANGE UP (ref 27–34)
MCHC RBC-ENTMCNC: 30.9 G/DL — LOW (ref 32–36)
MCV RBC AUTO: 89.4 FL — SIGNIFICANT CHANGE UP (ref 80–100)
MONOCYTES # BLD AUTO: 1.08 K/UL — HIGH (ref 0–0.9)
MONOCYTES NFR BLD AUTO: 8.9 % — SIGNIFICANT CHANGE UP (ref 2–14)
NEUTROPHILS # BLD AUTO: 8.44 K/UL — HIGH (ref 1.8–7.4)
NEUTROPHILS NFR BLD AUTO: 69.5 % — SIGNIFICANT CHANGE UP (ref 43–77)
NRBC # BLD AUTO: 0 K/UL — SIGNIFICANT CHANGE UP (ref 0–0)
NRBC # FLD: 0 K/UL — SIGNIFICANT CHANGE UP (ref 0–0)
NRBC BLD AUTO-RTO: 0 /100 WBCS — SIGNIFICANT CHANGE UP (ref 0–0)
PLATELET # BLD AUTO: 432 K/UL — HIGH (ref 150–400)
PMV BLD: 9.2 FL — SIGNIFICANT CHANGE UP (ref 7–13)
POTASSIUM SERPL-MCNC: 3.8 MMOL/L — SIGNIFICANT CHANGE UP (ref 3.5–5.3)
POTASSIUM SERPL-SCNC: 3.8 MMOL/L — SIGNIFICANT CHANGE UP (ref 3.5–5.3)
PROT SERPL-MCNC: 6.7 G/DL — SIGNIFICANT CHANGE UP (ref 6.6–8.7)
PROTHROM AB SERPL-ACNC: 10.9 SEC — SIGNIFICANT CHANGE UP (ref 9.9–13.4)
RBC # BLD: 3.98 M/UL — LOW (ref 4.2–5.8)
RBC # FLD: 15.5 % — HIGH (ref 10.3–14.5)
SODIUM SERPL-SCNC: 140 MMOL/L — SIGNIFICANT CHANGE UP (ref 135–145)
TROPONIN T, HIGH SENSITIVITY RESULT: 10 NG/L — SIGNIFICANT CHANGE UP (ref 0–51)
TROPONIN T, HIGH SENSITIVITY RESULT: 15 NG/L — SIGNIFICANT CHANGE UP (ref 0–51)
WBC # BLD: 12.15 K/UL — HIGH (ref 3.8–10.5)
WBC # FLD AUTO: 12.15 K/UL — HIGH (ref 3.8–10.5)

## 2025-02-22 PROCEDURE — 70498 CT ANGIOGRAPHY NECK: CPT | Mod: 26

## 2025-02-22 PROCEDURE — 99291 CRITICAL CARE FIRST HOUR: CPT

## 2025-02-22 PROCEDURE — 70496 CT ANGIOGRAPHY HEAD: CPT | Mod: 26

## 2025-02-22 PROCEDURE — 93010 ELECTROCARDIOGRAM REPORT: CPT | Mod: 76

## 2025-02-22 PROCEDURE — 0042T: CPT

## 2025-02-22 PROCEDURE — 93010 ELECTROCARDIOGRAM REPORT: CPT

## 2025-02-22 PROCEDURE — 70450 CT HEAD/BRAIN W/O DYE: CPT | Mod: 26,XU

## 2025-02-22 RX ORDER — ROSUVASTATIN CALCIUM 20 MG/1
20 TABLET, FILM COATED ORAL AT BEDTIME
Refills: 0 | Status: DISCONTINUED | OUTPATIENT
Start: 2025-02-22 | End: 2025-03-02

## 2025-02-22 RX ORDER — ASPIRIN 325 MG
81 TABLET ORAL DAILY
Refills: 0 | Status: DISCONTINUED | OUTPATIENT
Start: 2025-02-22 | End: 2025-03-02

## 2025-02-22 RX ORDER — APIXABAN 2.5 MG/1
5 TABLET, FILM COATED ORAL EVERY 12 HOURS
Refills: 0 | Status: DISCONTINUED | OUTPATIENT
Start: 2025-02-22 | End: 2025-03-02

## 2025-02-22 RX ORDER — DIAZEPAM 5 MG/1
5 TABLET ORAL ONCE
Refills: 0 | Status: DISCONTINUED | OUTPATIENT
Start: 2025-02-22 | End: 2025-02-23

## 2025-02-22 RX ORDER — METOCLOPRAMIDE HCL 10 MG
10 TABLET ORAL ONCE
Refills: 0 | Status: COMPLETED | OUTPATIENT
Start: 2025-02-22 | End: 2025-02-22

## 2025-02-22 RX ORDER — TORSEMIDE 20 MG/1
10 TABLET ORAL DAILY
Refills: 0 | Status: DISCONTINUED | OUTPATIENT
Start: 2025-02-22 | End: 2025-03-02

## 2025-02-22 RX ORDER — MIDODRINE HYDROCHLORIDE 5 MG/1
10 TABLET ORAL THREE TIMES A DAY
Refills: 0 | Status: DISCONTINUED | OUTPATIENT
Start: 2025-02-22 | End: 2025-03-02

## 2025-02-22 RX ORDER — CALCIUM CARBONATE 750 MG/1
2 TABLET ORAL ONCE
Refills: 0 | Status: COMPLETED | OUTPATIENT
Start: 2025-02-22 | End: 2025-02-22

## 2025-02-22 RX ORDER — PREDNISONE 20 MG/1
40 TABLET ORAL DAILY
Refills: 0 | Status: DISCONTINUED | OUTPATIENT
Start: 2025-02-22 | End: 2025-02-23

## 2025-02-22 RX ORDER — ACETAMINOPHEN 500 MG/5ML
1000 LIQUID (ML) ORAL ONCE
Refills: 0 | Status: COMPLETED | OUTPATIENT
Start: 2025-02-22 | End: 2025-02-22

## 2025-02-22 RX ADMIN — Medication 400 MILLIGRAM(S): at 19:50

## 2025-02-22 RX ADMIN — CALCIUM CARBONATE 2 TABLET(S): 750 TABLET ORAL at 22:19

## 2025-02-22 RX ADMIN — Medication 10 MILLIGRAM(S): at 19:49

## 2025-02-23 VITALS
TEMPERATURE: 98 F | DIASTOLIC BLOOD PRESSURE: 64 MMHG | OXYGEN SATURATION: 93 % | RESPIRATION RATE: 16 BRPM | HEART RATE: 64 BPM | SYSTOLIC BLOOD PRESSURE: 103 MMHG

## 2025-02-23 LAB
APPEARANCE UR: CLEAR — SIGNIFICANT CHANGE UP
BACTERIA # UR AUTO: ABNORMAL /HPF
BILIRUB UR-MCNC: NEGATIVE — SIGNIFICANT CHANGE UP
CAST: 0 /LPF — SIGNIFICANT CHANGE UP (ref 0–4)
COLOR SPEC: YELLOW — SIGNIFICANT CHANGE UP
DIFF PNL FLD: NEGATIVE — SIGNIFICANT CHANGE UP
GLUCOSE UR QL: 250 MG/DL
KETONES UR-MCNC: NEGATIVE MG/DL — SIGNIFICANT CHANGE UP
LEUKOCYTE ESTERASE UR-ACNC: ABNORMAL
NITRITE UR-MCNC: POSITIVE
PH UR: 7 — SIGNIFICANT CHANGE UP (ref 5–8)
PROT UR-MCNC: NEGATIVE MG/DL — SIGNIFICANT CHANGE UP
RBC CASTS # UR COMP ASSIST: 4 /HPF — SIGNIFICANT CHANGE UP (ref 0–4)
SP GR SPEC: 1.01 — SIGNIFICANT CHANGE UP (ref 1–1.03)
SQUAMOUS # UR AUTO: 1 /HPF — SIGNIFICANT CHANGE UP (ref 0–5)
UROBILINOGEN FLD QL: 0.2 MG/DL — SIGNIFICANT CHANGE UP (ref 0.2–1)
WBC UR QL: 44 /HPF — HIGH (ref 0–5)

## 2025-02-23 PROCEDURE — 81001 URINALYSIS AUTO W/SCOPE: CPT

## 2025-02-23 PROCEDURE — 87077 CULTURE AEROBIC IDENTIFY: CPT

## 2025-02-23 PROCEDURE — 85610 PROTHROMBIN TIME: CPT

## 2025-02-23 PROCEDURE — 36415 COLL VENOUS BLD VENIPUNCTURE: CPT

## 2025-02-23 PROCEDURE — 70450 CT HEAD/BRAIN W/O DYE: CPT | Mod: MC

## 2025-02-23 PROCEDURE — 99291 CRITICAL CARE FIRST HOUR: CPT | Mod: 25

## 2025-02-23 PROCEDURE — 84484 ASSAY OF TROPONIN QUANT: CPT

## 2025-02-23 PROCEDURE — 99238 HOSP IP/OBS DSCHRG MGMT 30/<: CPT

## 2025-02-23 PROCEDURE — 82962 GLUCOSE BLOOD TEST: CPT

## 2025-02-23 PROCEDURE — 85025 COMPLETE CBC W/AUTO DIFF WBC: CPT

## 2025-02-23 PROCEDURE — 0042T: CPT | Mod: MC

## 2025-02-23 PROCEDURE — 96375 TX/PRO/DX INJ NEW DRUG ADDON: CPT | Mod: XU

## 2025-02-23 PROCEDURE — 87086 URINE CULTURE/COLONY COUNT: CPT

## 2025-02-23 PROCEDURE — G0378: CPT | Mod: XU

## 2025-02-23 PROCEDURE — 70496 CT ANGIOGRAPHY HEAD: CPT | Mod: MC

## 2025-02-23 PROCEDURE — 96374 THER/PROPH/DIAG INJ IV PUSH: CPT | Mod: XU

## 2025-02-23 PROCEDURE — 80053 COMPREHEN METABOLIC PANEL: CPT

## 2025-02-23 PROCEDURE — 85730 THROMBOPLASTIN TIME PARTIAL: CPT

## 2025-02-23 PROCEDURE — 93005 ELECTROCARDIOGRAM TRACING: CPT

## 2025-02-23 PROCEDURE — 70498 CT ANGIOGRAPHY NECK: CPT | Mod: MC

## 2025-02-23 RX ORDER — DIAZEPAM 5 MG/1
5 TABLET ORAL ONCE
Refills: 0 | Status: DISCONTINUED | OUTPATIENT
Start: 2025-02-23 | End: 2025-03-02

## 2025-02-23 RX ORDER — PREDNISONE 20 MG/1
20 TABLET ORAL DAILY
Refills: 0 | Status: DISCONTINUED | OUTPATIENT
Start: 2025-02-23 | End: 2025-03-02

## 2025-02-23 RX ADMIN — MIDODRINE HYDROCHLORIDE 10 MILLIGRAM(S): 5 TABLET ORAL at 06:01

## 2025-02-23 RX ADMIN — APIXABAN 5 MILLIGRAM(S): 2.5 TABLET, FILM COATED ORAL at 06:01

## 2025-02-23 RX ADMIN — TORSEMIDE 10 MILLIGRAM(S): 20 TABLET ORAL at 06:02

## 2025-02-23 RX ADMIN — DIAZEPAM 5 MILLIGRAM(S): 5 TABLET ORAL at 00:26

## 2025-02-23 RX ADMIN — Medication 1000 MILLIGRAM(S): at 06:01

## 2025-02-23 RX ADMIN — PREDNISONE 20 MILLIGRAM(S): 20 TABLET ORAL at 06:13

## 2025-02-23 RX ADMIN — Medication 40 MILLIGRAM(S): at 06:02

## 2025-02-24 LAB
CULTURE RESULTS: ABNORMAL
SPECIMEN SOURCE: SIGNIFICANT CHANGE UP

## 2025-02-25 RX ORDER — SULFAMETHOXAZOLE/TRIMETHOPRIM 800-160 MG
1 TABLET ORAL
Qty: 14 | Refills: 0
Start: 2025-02-25 | End: 2025-03-03

## 2025-05-14 ENCOUNTER — OFFICE (OUTPATIENT)
Dept: URBAN - METROPOLITAN AREA CLINIC 113 | Facility: CLINIC | Age: 60
Setting detail: OPHTHALMOLOGY
End: 2025-05-14
Payer: COMMERCIAL

## 2025-05-14 DIAGNOSIS — H25.13: ICD-10-CM

## 2025-05-14 PROCEDURE — 99214 OFFICE O/P EST MOD 30 MIN: CPT | Performed by: STUDENT IN AN ORGANIZED HEALTH CARE EDUCATION/TRAINING PROGRAM

## 2025-05-14 ASSESSMENT — KERATOMETRY
OD_K1POWER_DIOPTERS: 41.00
OS_K2POWER_DIOPTERS: 41.50
OS_K1POWER_DIOPTERS: 40.50
OD_K2POWER_DIOPTERS: 42.00
OS_AXISANGLE_DEGREES: 095
OD_AXISANGLE_DEGREES: 092

## 2025-05-14 ASSESSMENT — REFRACTION_CURRENTRX
OS_OVR_VA: 20/
OS_SPHERE: +1.00
OD_CYLINDER: -0.75
OD_AXIS: 061
OD_SPHERE: +0.75
OS_CYLINDER: -0.50
OS_AXIS: 147
OS_VPRISM_DIRECTION: BF
OD_ADD: +2.75
OS_ADD: +2.75
OD_OVR_VA: 20/
OD_VPRISM_DIRECTION: BF

## 2025-05-14 ASSESSMENT — CONFRONTATIONAL VISUAL FIELD TEST (CVF)
OS_FINDINGS: FULL
OD_FINDINGS: FULL

## 2025-05-14 ASSESSMENT — REFRACTION_MANIFEST
OD_CYLINDER: SPH
OS_SPHERE: +0.25
OD_SPHERE: PLANO
OS_VA1: 20/20
OS_ADD: +2.75
OD_VA1: 20/20
OD_ADD: +2.75
OS_CYLINDER: SPH

## 2025-05-14 ASSESSMENT — REFRACTION_AUTOREFRACTION
OD_SPHERE: PLANO
OD_CYLINDER: -0.25
OS_SPHERE: +0.25
OD_AXIS: 065
OS_AXIS: 018
OS_CYLINDER: -0.50

## 2025-05-14 ASSESSMENT — VISUAL ACUITY
OS_BCVA: 20/40
OD_BCVA: 20/25

## 2025-06-18 ENCOUNTER — OFFICE (OUTPATIENT)
Dept: URBAN - METROPOLITAN AREA CLINIC 105 | Facility: CLINIC | Age: 60
Setting detail: OPHTHALMOLOGY
End: 2025-06-18
Payer: COMMERCIAL

## 2025-06-18 DIAGNOSIS — H25.11: ICD-10-CM

## 2025-06-18 DIAGNOSIS — H25.13: ICD-10-CM

## 2025-06-18 PROCEDURE — 99213 OFFICE O/P EST LOW 20 MIN: CPT | Performed by: STUDENT IN AN ORGANIZED HEALTH CARE EDUCATION/TRAINING PROGRAM

## 2025-06-18 PROCEDURE — 92136 OPHTHALMIC BIOMETRY: CPT | Performed by: STUDENT IN AN ORGANIZED HEALTH CARE EDUCATION/TRAINING PROGRAM

## 2025-06-18 ASSESSMENT — REFRACTION_CURRENTRX
OD_CYLINDER: -0.75
OD_VPRISM_DIRECTION: BF
OD_SPHERE: +0.75
OS_ADD: +2.75
OD_OVR_VA: 20/
OD_ADD: +2.75
OS_SPHERE: +1.00
OD_AXIS: 061
OS_CYLINDER: -0.50
OS_VPRISM_DIRECTION: BF
OS_OVR_VA: 20/
OS_AXIS: 147

## 2025-06-18 ASSESSMENT — KERATOMETRY
OS_CYLAXISANGLE_DEGREES: 095
OS_K2POWER_DIOPTERS: 41.50
OS_AXISANGLE_DEGREES: 5
OS_CYLPOWER_DEGREES: 1
OS_AXISANGLE_DEGREES: 095
OS_AXISANGLE2_DEGREES: 095
OS_K2POWER_DIOPTERS: 41.50
OD_AXISANGLE_DEGREES: 092
OS_K1K2_AVERAGE: 41
OS_K1POWER_DIOPTERS: 40.50
OD_AXISANGLE_DEGREES: 2
OD_CYLPOWER_DEGREES: 1
OD_K2POWER_DIOPTERS: 42.00
OS_K1POWER_DIOPTERS: 40.50
OD_K1POWER_DIOPTERS: 41.00
OD_AXISANGLE2_DEGREES: 092
OD_K1K2_AVERAGE: 41.5
OD_K1POWER_DIOPTERS: 41.00
OD_K2POWER_DIOPTERS: 42.00
OD_CYLAXISANGLE_DEGREES: 092

## 2025-06-18 ASSESSMENT — REFRACTION_AUTOREFRACTION
OS_CYLINDER: -0.50
OS_SPHERE: +0.25
OS_AXIS: 018
OD_CYLINDER: -0.25
OD_AXIS: 065
OD_SPHERE: PLANO

## 2025-06-18 ASSESSMENT — REFRACTION_MANIFEST
OD_ADD: +2.75
OD_VA1: 20/20
OS_SPHERE: +0.25
OS_VA1: 20/20
OS_CYLINDER: SPH
OS_ADD: +2.75
OD_CYLINDER: SPH
OD_SPHERE: PLANO

## 2025-06-18 ASSESSMENT — CONFRONTATIONAL VISUAL FIELD TEST (CVF)
OS_FINDINGS: FULL
OD_FINDINGS: FULL

## 2025-06-18 ASSESSMENT — VISUAL ACUITY
OS_BCVA: 20/40
OD_BCVA: 20/25

## 2025-06-24 ENCOUNTER — OFFICE (OUTPATIENT)
Dept: URBAN - METROPOLITAN AREA CLINIC 113 | Facility: CLINIC | Age: 60
Setting detail: OPHTHALMOLOGY
End: 2025-06-24
Payer: COMMERCIAL

## 2025-06-24 DIAGNOSIS — Z01.818: ICD-10-CM

## 2025-06-24 DIAGNOSIS — H25.13: ICD-10-CM

## 2025-06-24 PROBLEM — H43.811 POSTERIOR VITREOUS DETACHMENT; RIGHT EYE: Status: ACTIVE | Noted: 2025-06-18

## 2025-06-24 PROBLEM — D31.32 CHOROIDAL NEVUS, LEFT EYE: Status: ACTIVE | Noted: 2025-06-18

## 2025-06-24 PROCEDURE — 99213 OFFICE O/P EST LOW 20 MIN: CPT

## 2025-07-05 ENCOUNTER — INPATIENT (INPATIENT)
Facility: HOSPITAL | Age: 60
LOS: 1 days | Discharge: HOME | DRG: 392 | End: 2025-07-07
Attending: STUDENT IN AN ORGANIZED HEALTH CARE EDUCATION/TRAINING PROGRAM | Admitting: STUDENT IN AN ORGANIZED HEALTH CARE EDUCATION/TRAINING PROGRAM
Payer: MEDICARE

## 2025-07-05 VITALS
HEIGHT: 67 IN | WEIGHT: 213.85 LBS | SYSTOLIC BLOOD PRESSURE: 97 MMHG | DIASTOLIC BLOOD PRESSURE: 70 MMHG | RESPIRATION RATE: 18 BRPM | HEART RATE: 87 BPM | TEMPERATURE: 98 F | OXYGEN SATURATION: 98 %

## 2025-07-05 DIAGNOSIS — K46.9 UNSPECIFIED ABDOMINAL HERNIA WITHOUT OBSTRUCTION OR GANGRENE: Chronic | ICD-10-CM

## 2025-07-05 DIAGNOSIS — Z98.89 OTHER SPECIFIED POSTPROCEDURAL STATES: Chronic | ICD-10-CM

## 2025-07-05 DIAGNOSIS — K52.9 NONINFECTIVE GASTROENTERITIS AND COLITIS, UNSPECIFIED: ICD-10-CM

## 2025-07-05 LAB
ALBUMIN SERPL ELPH-MCNC: 3.5 G/DL — SIGNIFICANT CHANGE UP (ref 3.3–5.2)
ALP SERPL-CCNC: 63 U/L — SIGNIFICANT CHANGE UP (ref 40–120)
ALT FLD-CCNC: 10 U/L — SIGNIFICANT CHANGE UP
ANION GAP SERPL CALC-SCNC: 16 MMOL/L — SIGNIFICANT CHANGE UP (ref 5–17)
APPEARANCE UR: CLEAR — SIGNIFICANT CHANGE UP
APTT BLD: 34.1 SEC — SIGNIFICANT CHANGE UP (ref 26.1–36.8)
AST SERPL-CCNC: 17 U/L — SIGNIFICANT CHANGE UP
BACTERIA # UR AUTO: NEGATIVE /HPF — SIGNIFICANT CHANGE UP
BASOPHILS # BLD AUTO: 0.13 K/UL — SIGNIFICANT CHANGE UP (ref 0–0.2)
BASOPHILS # BLD MANUAL: 0.12 K/UL — SIGNIFICANT CHANGE UP (ref 0–0.2)
BASOPHILS NFR BLD AUTO: 1 % — SIGNIFICANT CHANGE UP (ref 0–2)
BASOPHILS NFR BLD MANUAL: 0.9 % — SIGNIFICANT CHANGE UP (ref 0–2)
BILIRUB SERPL-MCNC: 0.4 MG/DL — SIGNIFICANT CHANGE UP (ref 0.4–2)
BILIRUB UR-MCNC: NEGATIVE — SIGNIFICANT CHANGE UP
BUN SERPL-MCNC: 9.7 MG/DL — SIGNIFICANT CHANGE UP (ref 8–20)
C DIFF BY PCR RESULT: SIGNIFICANT CHANGE UP
CALCIUM SERPL-MCNC: 9.3 MG/DL — SIGNIFICANT CHANGE UP (ref 8.4–10.5)
CAST: 9 /LPF — HIGH (ref 0–4)
CHLORIDE SERPL-SCNC: 103 MMOL/L — SIGNIFICANT CHANGE UP (ref 96–108)
CO2 SERPL-SCNC: 23 MMOL/L — SIGNIFICANT CHANGE UP (ref 22–29)
COLOR SPEC: YELLOW — SIGNIFICANT CHANGE UP
CREAT SERPL-MCNC: 0.89 MG/DL — SIGNIFICANT CHANGE UP (ref 0.5–1.3)
DIFF PNL FLD: NEGATIVE — SIGNIFICANT CHANGE UP
EGFR: 98 ML/MIN/1.73M2 — SIGNIFICANT CHANGE UP
EGFR: 98 ML/MIN/1.73M2 — SIGNIFICANT CHANGE UP
EOSINOPHIL # BLD AUTO: 3.08 K/UL — HIGH (ref 0–0.5)
EOSINOPHIL # BLD MANUAL: 2.93 K/UL — HIGH (ref 0–0.5)
EOSINOPHIL NFR BLD AUTO: 23.8 % — HIGH (ref 0–6)
EOSINOPHIL NFR BLD MANUAL: 22.6 % — HIGH (ref 0–6)
ERYTHROCYTE [SEDIMENTATION RATE] IN BLOOD: 53 MM/HR — HIGH (ref 0–15)
FLUAV AG NPH QL: SIGNIFICANT CHANGE UP
FLUBV AG NPH QL: SIGNIFICANT CHANGE UP
GLUCOSE SERPL-MCNC: 118 MG/DL — HIGH (ref 70–99)
GLUCOSE UR QL: NEGATIVE MG/DL — SIGNIFICANT CHANGE UP
HCT VFR BLD CALC: 36.9 % — LOW (ref 39–50)
HGB BLD-MCNC: 11.2 G/DL — LOW (ref 13–17)
IMM GRANULOCYTES # BLD AUTO: 0.09 K/UL — HIGH (ref 0–0.07)
IMM GRANULOCYTES NFR BLD AUTO: 0.7 % — SIGNIFICANT CHANGE UP (ref 0–0.9)
INR BLD: 1.12 RATIO — SIGNIFICANT CHANGE UP (ref 0.85–1.16)
KETONES UR QL: NEGATIVE MG/DL — SIGNIFICANT CHANGE UP
LEUKOCYTE ESTERASE UR-ACNC: ABNORMAL
LIDOCAIN IGE QN: 19 U/L — LOW (ref 22–51)
LYMPHOCYTES # BLD AUTO: 2.44 K/UL — SIGNIFICANT CHANGE UP (ref 1–3.3)
LYMPHOCYTES # BLD MANUAL: 2.59 K/UL — SIGNIFICANT CHANGE UP (ref 1–3.3)
LYMPHOCYTES NFR BLD AUTO: 18.8 % — SIGNIFICANT CHANGE UP (ref 13–44)
LYMPHOCYTES NFR BLD MANUAL: 20 % — SIGNIFICANT CHANGE UP (ref 13–44)
MCHC RBC-ENTMCNC: 25.2 PG — LOW (ref 27–34)
MCHC RBC-ENTMCNC: 30.4 G/DL — LOW (ref 32–36)
MCV RBC AUTO: 82.9 FL — SIGNIFICANT CHANGE UP (ref 80–100)
MONOCYTES # BLD AUTO: 1.43 K/UL — HIGH (ref 0–0.9)
MONOCYTES # BLD MANUAL: 0.79 K/UL — SIGNIFICANT CHANGE UP (ref 0–0.9)
MONOCYTES NFR BLD AUTO: 11 % — SIGNIFICANT CHANGE UP (ref 2–14)
MONOCYTES NFR BLD MANUAL: 6.1 % — SIGNIFICANT CHANGE UP (ref 2–14)
NEUTROPHILS # BLD AUTO: 5.79 K/UL — SIGNIFICANT CHANGE UP (ref 1.8–7.4)
NEUTROPHILS # BLD MANUAL: 6.53 K/UL — SIGNIFICANT CHANGE UP (ref 1.8–7.4)
NEUTROPHILS NFR BLD AUTO: 44.7 % — SIGNIFICANT CHANGE UP (ref 43–77)
NEUTROPHILS NFR BLD MANUAL: 50.4 % — SIGNIFICANT CHANGE UP (ref 43–77)
NITRITE UR-MCNC: NEGATIVE — SIGNIFICANT CHANGE UP
NRBC # BLD AUTO: 0 K/UL — SIGNIFICANT CHANGE UP (ref 0–0)
NRBC # FLD: 0 K/UL — SIGNIFICANT CHANGE UP (ref 0–0)
NRBC BLD AUTO-RTO: 0 /100 WBCS — SIGNIFICANT CHANGE UP (ref 0–0)
PH UR: 6.5 — SIGNIFICANT CHANGE UP (ref 5–8)
PLAT MORPH BLD: NORMAL — SIGNIFICANT CHANGE UP
PLATELET # BLD AUTO: 679 K/UL — HIGH (ref 150–400)
PMV BLD: 9.2 FL — SIGNIFICANT CHANGE UP (ref 7–13)
POLYCHROMASIA BLD QL SMEAR: ABNORMAL
POTASSIUM SERPL-MCNC: 3.3 MMOL/L — LOW (ref 3.5–5.3)
POTASSIUM SERPL-SCNC: 3.3 MMOL/L — LOW (ref 3.5–5.3)
PROT SERPL-MCNC: 7.6 G/DL — SIGNIFICANT CHANGE UP (ref 6.6–8.7)
PROT UR-MCNC: NEGATIVE MG/DL — SIGNIFICANT CHANGE UP
PROTHROM AB SERPL-ACNC: 13 SEC — SIGNIFICANT CHANGE UP (ref 9.9–13.4)
RBC # BLD: 4.45 M/UL — SIGNIFICANT CHANGE UP (ref 4.2–5.8)
RBC # FLD: 15.8 % — HIGH (ref 10.3–14.5)
RBC BLD AUTO: ABNORMAL
RBC CASTS # UR COMP ASSIST: 1 /HPF — SIGNIFICANT CHANGE UP (ref 0–4)
RSV RNA NPH QL NAA+NON-PROBE: SIGNIFICANT CHANGE UP
SARS-COV-2 RNA SPEC QL NAA+PROBE: SIGNIFICANT CHANGE UP
SODIUM SERPL-SCNC: 142 MMOL/L — SIGNIFICANT CHANGE UP (ref 135–145)
SOURCE RESPIRATORY: SIGNIFICANT CHANGE UP
SP GR SPEC: 1.01 — SIGNIFICANT CHANGE UP (ref 1–1.03)
SQUAMOUS # UR AUTO: 2 /HPF — SIGNIFICANT CHANGE UP (ref 0–5)
UROBILINOGEN FLD QL: 0.2 MG/DL — SIGNIFICANT CHANGE UP (ref 0.2–1)
WBC # BLD: 12.96 K/UL — HIGH (ref 3.8–10.5)
WBC # FLD AUTO: 12.96 K/UL — HIGH (ref 3.8–10.5)
WBC UR QL: 4 /HPF — SIGNIFICANT CHANGE UP (ref 0–5)

## 2025-07-05 PROCEDURE — 99285 EMERGENCY DEPT VISIT HI MDM: CPT

## 2025-07-05 PROCEDURE — 71045 X-RAY EXAM CHEST 1 VIEW: CPT

## 2025-07-05 PROCEDURE — 81001 URINALYSIS AUTO W/SCOPE: CPT

## 2025-07-05 PROCEDURE — 83690 ASSAY OF LIPASE: CPT

## 2025-07-05 PROCEDURE — 99497 ADVNCD CARE PLAN 30 MIN: CPT | Mod: GC,25

## 2025-07-05 PROCEDURE — 85610 PROTHROMBIN TIME: CPT

## 2025-07-05 PROCEDURE — 36415 COLL VENOUS BLD VENIPUNCTURE: CPT

## 2025-07-05 PROCEDURE — 74177 CT ABD & PELVIS W/CONTRAST: CPT

## 2025-07-05 PROCEDURE — 87493 C DIFF AMPLIFIED PROBE: CPT

## 2025-07-05 PROCEDURE — 85025 COMPLETE CBC W/AUTO DIFF WBC: CPT

## 2025-07-05 PROCEDURE — 71260 CT THORAX DX C+: CPT

## 2025-07-05 PROCEDURE — 80053 COMPREHEN METABOLIC PANEL: CPT

## 2025-07-05 PROCEDURE — 71045 X-RAY EXAM CHEST 1 VIEW: CPT | Mod: 26

## 2025-07-05 PROCEDURE — 74177 CT ABD & PELVIS W/CONTRAST: CPT | Mod: 26

## 2025-07-05 PROCEDURE — 71260 CT THORAX DX C+: CPT | Mod: 26

## 2025-07-05 PROCEDURE — 99223 1ST HOSP IP/OBS HIGH 75: CPT | Mod: GC

## 2025-07-05 PROCEDURE — 85730 THROMBOPLASTIN TIME PARTIAL: CPT

## 2025-07-05 PROCEDURE — 85652 RBC SED RATE AUTOMATED: CPT

## 2025-07-05 PROCEDURE — 0241U: CPT

## 2025-07-05 PROCEDURE — 93005 ELECTROCARDIOGRAM TRACING: CPT

## 2025-07-05 RX ORDER — ACETAMINOPHEN 500 MG/5ML
650 LIQUID (ML) ORAL EVERY 6 HOURS
Refills: 0 | Status: DISCONTINUED | OUTPATIENT
Start: 2025-07-05 | End: 2025-07-07

## 2025-07-05 RX ORDER — ONDANSETRON HCL/PF 4 MG/2 ML
4 VIAL (ML) INJECTION ONCE
Refills: 0 | Status: COMPLETED | OUTPATIENT
Start: 2025-07-05 | End: 2025-07-05

## 2025-07-05 RX ORDER — MAGNESIUM, ALUMINUM HYDROXIDE 200-200 MG
30 TABLET,CHEWABLE ORAL EVERY 4 HOURS
Refills: 0 | Status: DISCONTINUED | OUTPATIENT
Start: 2025-07-05 | End: 2025-07-07

## 2025-07-05 RX ORDER — ACETAMINOPHEN 500 MG/5ML
1000 LIQUID (ML) ORAL ONCE
Refills: 0 | Status: COMPLETED | OUTPATIENT
Start: 2025-07-05 | End: 2025-07-05

## 2025-07-05 RX ORDER — MELATONIN 5 MG
3 TABLET ORAL AT BEDTIME
Refills: 0 | Status: DISCONTINUED | OUTPATIENT
Start: 2025-07-05 | End: 2025-07-07

## 2025-07-05 RX ORDER — TAMSULOSIN HYDROCHLORIDE 0.4 MG/1
0.4 CAPSULE ORAL AT BEDTIME
Refills: 0 | Status: DISCONTINUED | OUTPATIENT
Start: 2025-07-05 | End: 2025-07-06

## 2025-07-05 RX ORDER — DILTIAZEM HYDROCHLORIDE 240 MG/1
1 TABLET, EXTENDED RELEASE ORAL
Refills: 0 | DISCHARGE

## 2025-07-05 RX ORDER — ROSUVASTATIN CALCIUM 20 MG/1
20 TABLET, FILM COATED ORAL AT BEDTIME
Refills: 0 | Status: DISCONTINUED | OUTPATIENT
Start: 2025-07-05 | End: 2025-07-07

## 2025-07-05 RX ORDER — ONDANSETRON HCL/PF 4 MG/2 ML
4 VIAL (ML) INJECTION EVERY 8 HOURS
Refills: 0 | Status: DISCONTINUED | OUTPATIENT
Start: 2025-07-05 | End: 2025-07-07

## 2025-07-05 RX ORDER — COSYNTROPIN 0.25 MG/ML
0.25 INJECTION, SOLUTION INTRAVENOUS ONCE
Refills: 0 | Status: COMPLETED | OUTPATIENT
Start: 2025-07-05 | End: 2025-07-05

## 2025-07-05 RX ADMIN — Medication 20 MILLIEQUIVALENT(S): at 16:28

## 2025-07-05 RX ADMIN — Medication 1000 MILLILITER(S): at 14:03

## 2025-07-05 RX ADMIN — COSYNTROPIN 0.25 MILLIGRAM(S): 0.25 INJECTION, SOLUTION INTRAVENOUS at 19:58

## 2025-07-05 RX ADMIN — Medication 4 MILLIGRAM(S): at 14:03

## 2025-07-05 RX ADMIN — Medication 400 MILLIGRAM(S): at 14:03

## 2025-07-05 RX ADMIN — Medication 4 MILLIGRAM(S): at 19:57

## 2025-07-05 NOTE — PATIENT PROFILE ADULT - FALL HARM RISK - RISK INTERVENTIONS
Assistance with ambulation/Communicate Fall Risk and Risk Factors to all staff, patient, and family/Monitor for mental status changes/Monitor gait and stability/Reinforce activity limits and safety measures with patient and family/Sit up slowly, dangle for a short time, stand at bedside before walking/Use of alarms - bed, chair and/or voice tab/Visual Cue: Yellow wristband/Bed in lowest position, wheels locked, appropriate side rails in place/Call bell, personal items and telephone in reach/Instruct patient to call for assistance before getting out of bed or chair/Non-slip footwear when patient is out of bed/[place] to call system/Physically safe environment - no spills, clutter or unnecessary equipment/Purposeful Proactive Rounding/Room/bathroom lighting operational, light cord in reach

## 2025-07-05 NOTE — H&P ADULT - CONVERSATION DETAILS
Discussed with patient life sustaining measures if they were to become necessary, patient states he wants everything done. Explained process of CPR and intubation. Discussed with patient life sustaining measures if they were to become necessary, patient states he wants everything done. Explained process of CPR and intubation.  Pt is full code.

## 2025-07-05 NOTE — ED ADULT NURSE NOTE - NSICDXPASTMEDICALHX_GEN_ALL_CORE_FT
PAST MEDICAL HISTORY:  Afib 2015    Broken ribs left posterior 9th and 10th    Chronic sinusitis     DDD (degenerative disc disease), lumbar     [place] (hard of hearing), bilateral     Lung nodules watching    Renal calculus current    Sleep apnea not using c-pap    Ulcerative colitis

## 2025-07-05 NOTE — ED ADULT TRIAGE NOTE - CHIEF COMPLAINT QUOTE
pt c/o hasn't eaten x 1 week, has colitis flare up, c/o SOB  A&Ox3, resp wnl, sent by MD for evaluation

## 2025-07-05 NOTE — ED PROVIDER NOTE - OBJECTIVE STATEMENT
58 y/o M PMHx UC on mesalamine , SALLY, Afib presents with 1 weeks of abdominal pain/inability to tolerate PO. States symptoms began about a week ago, states is unable to tolerate PO as it makes pt nauseous with retching but denies vomiting. [facility] had a flare up of UC approximately several weeks ago with symptoms lasting 1 months. [facility] prednisone was discontinued 1 week ago. [facility] had outpatient labs and telehealth visit today, Provider was concerned about labs and sent in for evaluation. [facility] was bit by a tick and had negative outpatient work up. Endorses myalgias, SOB, back pain, loose stools, SOB, Headache. Denies fevers, chills, CP, urinary symptoms.

## 2025-07-05 NOTE — H&P ADULT - ATTENDING COMMENTS
59yoM hx Afib s/p Watchman (1/2025), not on AC, UC previously on chronic steroids up until recently with rapid taper off, ?CHF on torsemide, ?orthostatic hypotension on midodrine as needed presenting with abdominal pain, hematochezia, weakness, malaise, on presentation found to be borderline hypotensive and hypokalemic.  ED attending, Dr. Ryan spoke with on call endocrinologist given concern for adrenal insufficiency.  Endocrinologist on call advised overnight cosyntropin test which was performed and to hold off on resuming steroids until their evaluation .  BP improved w/ IVF and holding off diuretic.  Pt could be in UC flare given colitis on CT scan and recent steroid tapering but since completing cosyntropin test and awaiting AM cortisol, will hold off on resuming steroids.  Would only plan to give stress dose steroids if pt exhibited IVF refractory hypotension overnight.   Will also defer IV antibiotics while awaiting GI PCR, stool cultures.  Incidental finding of spiculated lung mass on CT, both pt and wife notified, inpatient oncology consulted as pt higher risk of malignancy given smoking hx.   Of note, pt w/ some episodic Afib w/ RVR overnight, suspected related to hypovolemia, electrolyte derangements. 59yoM hx Afib s/p Watchman (1/2025), not on AC, UC on mesalamine, previously on chronic steroids up until recently with rapid taper off, ?CHF on torsemide, ?orthostatic hypotension on midodrine as needed presenting with abdominal pain, hematochezia, weakness, malaise, on presentation found to be borderline hypotensive and hypokalemic.  ED attending, Dr. Ryan spoke with on call endocrinologist given concern for adrenal insufficiency.  Endocrinologist on call advised overnight cosyntropin test which was performed and to hold off on resuming steroids until their evaluation .  BP improved w/ IVF and holding off diuretic.  Pt could be in UC flare given colitis on CT scan and recent steroid tapering but since completing cosyntropin test and awaiting AM cortisol, will hold off on resuming steroids.  Would only plan to give stress dose steroids if pt exhibited IVF refractory hypotension overnight.   Will also defer IV antibiotics while awaiting GI PCR, stool cultures.  Incidental finding of spiculated lung mass on CT, both pt and wife notified, inpatient oncology consulted as pt higher risk of malignancy given smoking hx.   Of note, pt w/ some episodic Afib w/ RVR overnight, suspected related to hypovolemia, electrolyte derangements.

## 2025-07-05 NOTE — H&P ADULT - NSICDXPASTMEDICALHX_GEN_ALL_CORE_FT
PAST MEDICAL HISTORY:  Afib 2015    Broken ribs left posterior 9th and 10th    Chronic sinusitis     DDD (degenerative disc disease), lumbar     [place] (hard of hearing), bilateral     Lung nodules watching    Renal calculus current    Sleep apnea not using c-pap    Ulcerative colitis

## 2025-07-05 NOTE — ED PROVIDER NOTE - NSICDXPASTMEDICALHX_GEN_ALL_CORE_FT
PAST MEDICAL HISTORY:  Afib 2015    Broken ribs left posterior 9th and 10th    Chronic sinusitis     DDD (degenerative disc disease), lumbar     [place] (hard of hearing), bilateral     Lung nodules watching    Renal calculus current    Sleep apnea not using c-pap    Ulcerative colitis

## 2025-07-05 NOTE — H&P ADULT - ASSESSMENT
Patient is a 59 year old male with history of UC on mesaline, SALLY, afib s/p ablation, CHF of unknown type presenting with 1 week of abodminal pain/inability to tolerate PO also with right flank pain. Was found to be hypotensive in ED and tested for possible adrenal insufficiency. Patient showing colitis on CT scan.     #Colitis 2/2 UC flair up vs infectious  #hematochezia   -CT showing mild colitis of the transverse colon  -Patient with leukocytosis supports possible infectious eitiology but negative for fevers.   -GI consulted recommending GI-PCR before starting steroifds  -C. diff negative  -GI-PCR pending  -s/p morphine in ED  -reported bloody bowel movement in ED, hold AC until negative for hematochezia  -continue mesalamine    #Hypotensive in ED  #Generalized weakness  -possibly due to adrenal insufficiency 2/2 steroid use  -patient on midodrine at home for hypotension, restarted  -not sure of reason for midodrine per patient he has CHF but unsure of type states there is a problem with the heart.   -endo consulted recommended cosyntropin test for adrenal insufficiency   -will hold steroids right now due to concern of infectious etiology    #HLD  -continue rosuvastatin    Afib s/p watchman and ablation  -on diltalizem hold due to hypotension restart if indicated  -not on AC since watchman was performed.     CHF  -not sure of type  -hold torsemide due to hypotension    #Hypokalemia  -chronic on potassium chloride at home  -K+ given in ED  -related to AI?    #Lung nodule  -patient with lung nodule on CT scan  -patient informed  -follow up as outpatient.    #BPH  -continue tamsulosin    DVT prophylaxis: hold for time being due to reported hematozia can resume if patient has non bloody BM  dispo: admit for 1-2 days peding GI PCR and resolution of hypotension.  Patient is a 59 year old male with history of UC on mesaline, SALLY, afib s/p ablation, CHF of unknown type presenting with 1 week of abodminal pain/inability to tolerate PO also with right flank pain. Was found to be hypotensive in ED and tested for possible adrenal insufficiency. Patient showing colitis on CT scan.     #Colitis 2/2 UC flair up vs infectious  #hematochezia   -CT showing mild colitis of the transverse colon  -Patient with leukocytosis supports possible infectious eitiology but negative for fevers.   -GI consulted recommending GI-PCR before starting steroifds  -C. diff negative  -GI-PCR pending  -s/p morphine in ED  -reported bloody bowel movement in ED, hold AC until negative for hematochezia  -continue mesalamine    #Hypotensive in ED  #Generalized weakness  -possibly due to adrenal insufficiency 2/2 steroid use  -patient on midodrine at home for hypotension, restarted  -not sure of reason for midodrine per patient he has CHF but unsure of type states there is a problem with the heart.   -endo consulted recommended cosyntropin test for adrenal insufficiency   -will hold steroids right now due to concern of infectious etiology    #HLD  -continue rosuvastatin    Afib s/p watchman and ablation  -on diltalizem hold due to hypotension restart if indicated  -not on AC since watchman was performed.   -Goes into a fib paroxysmally but not sustaining.     CHF  -not sure of type  -hold torsemide due to hypotension    #Hypokalemia  -chronic on potassium chloride at home  -K+ given in ED  -related to AI?    #Lung nodule  -patient with lung nodule on CT scan  -patient informed  -follow up as outpatient.    #BPH  -continue tamsulosin    DVT prophylaxis: hold for time being due to reported hematozia can resume if patient has non bloody BM  dispo: admit for 1-2 days peding GI PCR and resolution of hypotension.  Patient is a 59 year old male with history of UC on mesaline, SALLY, afib s/p ablation, CHF of unknown type presenting with 1 week of abodminal pain/inability to tolerate PO also with right flank pain. Was found to be hypotensive in ED and tested for possible adrenal insufficiency. Patient showing colitis on CT scan.     #Colitis 2/2 UC flair up vs infectious colitis  #hematochezia   -CT showing mild colitis of the transverse colon  -Patient with leukocytosis supports possible infectious eitiology but negative for fevers.   -GI consulted recommending GI-PCR before starting steroids  -C. diff negative  -GI-PCR pending  -s/p morphine in ED  -reported bloody bowel movement in ED, hold AC until negative for hematochezia  -continue mesalamine    #Borderline blood pressure 2/2 chronic orthostatics vs adrenal insufficiency  #Generalized weakness  -patient on midodrine at home for hypotension, restarted  -not sure of reason for midodrine possibly due to orthostatic hypotension.  -endo consulted recommended cosyntropin test for adrenal insufficiency   -will hold steroids right now due to concern of infectious etiology and to not interfere with AM cortisol.     #HLD  -continue rosuvastatin    Afib s/p watchman and ablation  -on diltalizem hold due to borderline BP restart if indicated  -not on AC since watchman was performed.   -Goes into a fib paroxysmally but not sustaining.   -possibly  due to hypotension will give bolus  -check mag and check potassium    CHF  -not sure of type  -hold torsemide due to hypotension    #Hypokalemia  -chronic on potassium chloride at home  -K+ given in ED  -related to AI?    #Lung nodule  -patient with lung nodule on CT scan  -patient informed  -follow up as outpatient.    #BPH  -continue tamsulosin    SALLY  -bipap for tonight  -to bring in home machine.     DVT prophylaxis: hold for time being due to reported hematozia can resume if patient has non bloody BM  dispo: admit for 1-2 days peding GI PCR and resolution of hypotension.  Patient is a 59 year old male with history of UC on mesaline, SALLY, afib s/p ablation, CHF of unknown type presenting with 1 week of abodminal pain/inability to tolerate PO also with right flank pain. Was found to be hypotensive in ED and tested for possible adrenal insufficiency. Patient showing colitis on CT scan.     #Colitis 2/2 UC flair up vs infectious colitis  #hematochezia   -CT showing mild colitis of the transverse colon  -will defer antibiotics at this time due to lack of fevers and suspicion of UC eitology, start if GI PCR is positive.   -Patient with leukocytosis supports possible infectious eitiology but negative for fevers.   -GI consulted recommending GI-PCR before starting steroids  -C. diff negative  -GI-PCR pending  -s/p morphine in ED  -reported bloody bowel movement in ED, hold AC until negative for hematochezia  -continue mesalamine    #Borderline blood pressure 2/2 chronic orthostatics vs adrenal insufficiency  #Generalized weakness  -patient on midodrine at home for hypotension, restarted  -not sure of reason for midodrine possibly due to orthostatic hypotension.  -endo consulted recommended cosyntropin test for adrenal insufficiency   -will hold steroids right now due to concern of infectious etiology and to not interfere with AM cortisol.     #HLD  -continue rosuvastatin    Afib s/p watchman and ablation  -on diltalizem hold due to borderline BP restart if indicated  -not on AC since watchman was performed.   -Goes into a fib paroxysmally but not sustaining.   -possibly  due to hypotension will give bolus  -check mag and check potassium    CHF  -not sure of type  -hold torsemide due to hypotension    #Hypokalemia  -chronic on potassium chloride at home  -K+ given in ED  -related to AI?    #Lung nodule  -patient with lung nodule on CT scan  -patient informed  -follow up as outpatient.    #BPH  -continue tamsulosin    SALLY  -bipap for tonight  -to bring in home machine.     DVT prophylaxis: hold for time being due to reported hematozia can resume if patient has non bloody BM  dispo: admit for 1-2 days peding GI PCR and resolution of hypotension.  Patient is a 59 year old male with history of UC on mesaline, SALLY, afib s/p ablation, CHF of unknown type presenting with 1 week of abodminal pain/inability to tolerate PO also with right flank pain. Was found to be hypotensive in ED and tested for possible adrenal insufficiency. Patient showing colitis on CT scan.     #Colitis 2/2 UC flair up vs infectious colitis  #hematochezia   -CT showing mild colitis of the transverse colon  -will defer antibiotics at this time due to lack of fevers and suspicion of UC eitology, start if GI PCR is positive.   -Patient with leukocytosis supports possible infectious eitiology but negative for fevers.   -GI consulted recommending GI-PCR before starting steroids  -C. diff negative  -GI-PCR pending  -s/p morphine in ED  -reported bloody bowel movement in ED, hold AC until negative for hematochezia  -continue mesalamine    #Borderline blood pressure 2/2 chronic orthostatics vs adrenal insufficiency  #Generalized weakness  -patient on midodrine at home for hypotension, restarted  -not sure of reason for midodrine possibly due to orthostatic hypotension.  -endo consulted recommended cosyntropin test for adrenal insufficiency   -will hold steroids right now due to concern of infectious etiology and to not interfere with AM cortisol.     #HLD  -continue rosuvastatin    Afib s/p watchman and ablation  -continue diltiazem  -not on AC since watchman was performed.   -Goes into a fib paroxysmally but not sustaining.   -possibly  due to hypotension will give bolus  -check mag and check potassium    CHF  -not sure of type  -hold torsemide due to hypotension    #Hypokalemia  -chronic on potassium chloride at home  -K+ given in ED  -related to AI?    #Lung nodule  -patient with lung nodule on CT scan  -patient informed  -follow up as outpatient.  -onc consult due to spiculation, size and hx of smoking     #BPH  -continue tamsulosin    SALLY  -bipap for tonight  -to bring in home machine.     DVT prophylaxis: hold for time being due to reported hematozia can resume if patient has non bloody BM  dispo: admit for 1-2 days peding GI PCR and resolution of hypotension.  Patient is a 59 year old male with history of UC on mesalamine SALLY, afib s/p ablation, CHF of unknown type presenting with 1 week of abdominal pain/inability to tolerate PO also with right flank pain. Was found to be hypotensive in ED and tested for possible adrenal insufficiency. Patient showing colitis on CT scan.     #Colitis 2/2 UC flair up vs infectious colitis  #hematochezia   -CT showing mild colitis of the transverse colon  -will defer antibiotics at this time due to lack of fevers and suspicion of UC eitology, start if GI PCR is positive.   -Patient with leukocytosis supports possible infectious eitiology but negative for fevers.   -GI consulted recommending GI-PCR before starting steroids  -C. diff negative  -GI-PCR pending  -s/p morphine in ED  -reported bloody bowel movement in ED, hold AC until negative for hematochezia  -continue mesalamine    #Borderline blood pressure 2/2 chronic orthostatics vs adrenal insufficiency  #Generalized weakness  -patient on midodrine at home for hypotension, restarted  -not sure of reason for midodrine possibly due to orthostatic hypotension.  -endo consulted recommended cosyntropin test for adrenal insufficiency   -will hold steroids right now due to concern of infectious etiology and to not interfere with AM cortisol.     #HLD  -continue rosuvastatin    Afib s/p watchman and ablation  -continue diltiazem  -not on AC since watchman was performed.   -Goes into a fib paroxysmally but not sustaining.   -possibly  due to hypotension will give bolus  -check mag and check potassium    CHF  -not sure of type  -hold torsemide due to hypotension    #Hypokalemia  -chronic on potassium chloride at home  -K+ given in ED  -related to AI?    #Lung nodule  -patient with lung nodule on CT scan  -patient informed  -follow up as outpatient.  -onc consult due to spiculation, size and hx of smoking     #BPH  -continue tamsulosin    SALLY  -bipap for tonight  -to bring in home machine.     DVT prophylaxis: hold for time being due to reported hematozia can resume if patient has non bloody BM  dispo: admit for 1-2 days peding GI PCR and resolution of hypotension.

## 2025-07-05 NOTE — H&P ADULT - HISTORY OF PRESENT ILLNESS
Patient is a 59 year old male with history of UC on mesaline, SALLY, afib s/p ablation, CHF of unknown type presenting with 1 week of abodminal pain/inability to tolerate PO. Patient had flare of UC around 2 weeks ago and was treated with prednisone Patient completed self-taper which he has done in the past. Patient now experiencing abdominal pain and having soft stool. Patient states he has also been feeling some shortness of breath recently. Patient indicating abdominal pain in left lower quadrant. Patient also states pain has been in right flank as well. Patient has also been vomiting. Denies Chest pain,  fevers, chills, nausea, diarrhea, dysuria, headache, dizziness.  Patient is a 59 year old male with history of UC on mesaline, SALLY, afib s/p ablation, CHF of unknown type presenting with 1 week of abodminal pain/inability to tolerate PO. Patient has also been feeling very weak during this time. Patient had flare of UC around 2 weeks ago and was treated with prednisone Patient completed self-taper which he has done in the past. Patient now experiencing abdominal pain and having soft stool. Patient states he has also been feeling some shortness of breath recently. Patient indicating abdominal pain in left lower quadrant. Patient also states pain has been in right flank as well. Patient states with last bowel movement may have had some blood. Patient has also been vomiting. Denies Chest pain,  fevers, chills, nausea, diarrhea, dysuria, headache, dizziness. Patient found to be hypotensive in the ED with concern for adrenal insufficiency. Patient CT showing pulmonary nodule and transverse colitis. Endocrinology consulted and recommended to do a  Patient is a 59 year old male with history of UC on mesaline, SALLY, afib s/p ablation, CHF of unknown type presenting with 1 week of abodminal pain/inability to tolerate PO. Patient has also been feeling very weak during this time. Patient had flare of UC around 2 weeks ago and was treated with prednisone Patient completed self-taper which he has done in the past. Patient now experiencing abdominal pain and having soft stool. Patient states he has also been feeling some shortness of breath recently. Patient indicating abdominal pain in left lower quadrant. Patient also states pain has been in right flank as well. Patient states with last bowel movement may have had some blood. Of note patient on midodrine at home states his blood pressure does sometimes run low. Patient states to have CHF but does not know what type given that he is on midodrine likely HFrEF. Patient has also been vomiting. Denies Chest pain,  fevers, chills, nausea, diarrhea, dysuria, headache, dizziness. Patient found to be hypotensive in the ED with concern for adrenal insufficiency. Patient CT showing pulmonary nodule and transverse colitis. Endocrinology consulted and recommended to do a ATCH stimulation test.      Patient is a 59 year old male with history of UC on mesaline, SALLY, afib s/p ablation, CHF of unknown type presenting with 1 week of abodminal pain/inability to tolerate PO. Patient has also been feeling very weak during this time. Patient had flare of UC around 2 weeks ago and was treated with prednisone Patient completed self-taper which he has done in the past. Patient now experiencing abdominal pain and having soft stool. Patient states he has also been feeling some shortness of breath recently. Patient indicating abdominal pain in left lower quadrant. Patient also states pain has been in right flank as well. Patient states with last bowel movement may have had some blood. Of note patient on midodrine at home states his blood pressure does sometimes run low. Patient states to have CHF but does not know what type given that he is on midodrine likely HFrEF. Patient has also been vomiting. Denies Chest pain,  fevers, chills, nausea, diarrhea, dysuria, headache, dizziness. Patient found to be hypotensive in the ED with concern for adrenal insufficiency. Patient CT showing pulmonary nodule and transverse colitis. Endocrinology consulted and recommended to do a ATCH stimulation test.     Per nurse patient has been in A fib occasionally and will go to 140s but then return to baseline  bpm.  Patient is a 59 year old male with history of UC on mesalamine, SALLY, afib s/p ablation, CHF of unknown type presenting with 1 week of abdominal pain/inability to tolerate PO. Patient has also been feeling very weak during this time. Patient had flare of UC around 2 weeks ago and was treated with prednisone Patient completed self-taper which he has done in the past. Patient now experiencing abdominal pain and having soft stool. Patient states he has also been feeling some shortness of breath recently. Patient indicating abdominal pain in left lower quadrant. Patient also states pain has been in right flank as well. Patient states with last bowel movement may have had some blood. Of note patient on midodrine at home states his blood pressure does sometimes run low. Patient states to have CHF but does not know what type given that he is on midodrine likely HFrEF. Patient has also been vomiting. Denies Chest pain,  fevers, chills, nausea, diarrhea, dysuria, headache, dizziness. Patient found to be hypotensive in the ED with concern for adrenal insufficiency. Patient CT showing pulmonary nodule and transverse colitis. Endocrinology consulted and recommended to do a ATCH stimulation test.     Per nurse patient has been in A fib occasionally and will go to 140s but then return to baseline  bpm.

## 2025-07-05 NOTE — ED ADULT NURSE NOTE - CHIEF COMPLAINT QUOTE
pt c/o hasn't eaten x 1 week, has colitis flare up, c/o SOB  A&Ox3, resp wnl, sent by MD for evaluation No

## 2025-07-05 NOTE — ED PROVIDER NOTE - PHYSICAL EXAMINATION
General: Awake, alert, Uncomfortable appearing.   HEENT: Normocephalic, atraumatic. No scleral icterus or conjunctival injection. EOMI. Moist mucous membranes. Oropharynx clear.   Neck:. Soft and supple.  Cardiac: RRR, Peripheral pulses 2+ and symmetric. No LE edema. Cap refill 3 sec  Resp: Lungs CTAB. No accessory muscle use  Abd: Soft, ttp in RLQ, +distended. Reducible umbilical hernia.   Back: Spine midline and non-tender.   Skin: No rashes, abrasions, or lacerations.  Neuro: AO x 4. Moves all extremities symmetrically.   Psych: Appropriate mood and affect

## 2025-07-05 NOTE — H&P ADULT - NSICDXPASTSURGICALHX_GEN_ALL_CORE_FT
PAST SURGICAL HISTORY:  Hernia inguinal left    History of colonoscopy     Presence of Watchman left atrial appendage closure device     S/P ablation of atrial flutter

## 2025-07-05 NOTE — H&P ADULT - NSHPPHYSICALEXAM_GEN_ALL_CORE
GENERAL: Obese, no acute distress, comfortably in bed  HEAD: NC/AT  EYES: PERRLA, EOMI, Non-icteric  ENT: Mucous membranes moist, neck supple  NEURO: No focal deficits, moving all extremities spontaneously, A&Ox3, no dysarthria, CN II-XII grossly intact  PSYCH: Normal affect, calm, appropriate insight and judgment, fluent speech  RESP: CTAB, no wrr, non-labored breathing  CVS: RRR, no murmur appreciated  GI: Soft, tender in LLQ and at site of umbilical hernia, non-distended, no organomegaly, no appreciable masses, +bs all 4 quadrants  : No myrick, no suprapubic tenderness  EXTREMITIES: Nontender, no clubbing, cyanosis, or edema  SKIN: No rashes or lesions

## 2025-07-05 NOTE — ED PROVIDER NOTE - CLINICAL SUMMARY MEDICAL DECISION MAKING FREE TEXT BOX
58 y/o M PMHx UC on mesalamine , SALLY, Afib presents with 1 weeks of abdominal pain/inability to tolerate PO. [facility] had outpatient labs and telehealth visit today, Provider was concerned about labs and sent in for evaluation. States was bit by a tick and had negative outpatient work up. Endorses myalgias, SOB, back pain, loose stools, SOB, Headache. Denies fevers, chills, CP, urinary symptoms. Physical exam with evidence of ttp in abdominal region. Unclear etiology, possible UC flare up vs other unknown causes, will obtain labs, imaging. to follow up.

## 2025-07-05 NOTE — ED PROVIDER NOTE - PROGRESS NOTE DETAILS
Marible CALLEJAS: Imaging reviewed, CT findings with possible Urine leakage. Urology consulted. Maribel CALLEJAS: CT imaging reviewed, signs of colitis and new lung nodule. GI consult for colitis, Rec overnight stay and GI-PCR to guide management. Pt informed of Lung Nodule and need for further work up with imaging. Pt verbalized understanding and in agreement. Asking for pain meds, morphine ordered. Endo Consulted for Adrenal Insuff. GI to see in morning.

## 2025-07-05 NOTE — H&P ADULT - NSHPLABSRESULTS_GEN_ALL_CORE
Patient is a 59 year old male with history of UC on mesaline, SALLY, afib s/p ablation, CHF of unknown type presenting with 1 week of LLQ and right flank pain. Patient found to be hypotensive on admission. CTA showing colitis of tranverse colon.

## 2025-07-05 NOTE — ED PROVIDER NOTE - OTHER FINDINGS
Only use albuterol inhaler AS NEEDED for shortness of breath or wheezing.   I did not hear any wheezing in your lungs today.    Patient Education     Acute Bronchitis  Your healthcare provider has told you that you have acute bronchitis. Bronchitis is infection or inflammation of the bronchial tubes (airways in the lungs). Normally, air moves easily in and out of the airways. Bronchitis narrows the airways, making it harder for air to flow in and out of the lungs. This causes symptoms such as shortness of breath, coughing up yellow or green mucus, and wheezing. Bronchitis can be acute or chronic. Acute means the condition comes on quickly and goes away in a short time, usually within 3 to 10 days. Chronic means a condition lasts a long time and often comes back.    What causes acute bronchitis?  Acute bronchitis almost always starts as a viral respiratory infection, such as a cold or the flu. Certain factors make it more likely for a cold or flu to turn into bronchitis. These include being very young, being elderly, having a heart or lung problem, or having a weak immune system. Cigarette smoking also makes bronchitis more likely.  When bronchitis develops, the airways become swollen. The airways may also become infected with bacteria. This is known as a secondary infection.  Diagnosing acute bronchitis  Your healthcare provider will examine you and ask about your symptoms and health history. You may also have a sputum culture to test the fluid in your lungs. Chest X-rays may be done to look for infection in the lungs.  Treating acute bronchitis  Bronchitis usually clears up as the cold or flu goes away. You can help feel better faster by doing the following:  · Take medicine as directed. You may be told to take ibuprofen or other over-the-counter medicines. These help relieve inflammation in your bronchial tubes. Your healthcare provider may prescribe an inhaler to help open up the bronchial tubes. Most of the  time, acute bronchitis is caused by a viral infection. Antibiotics are usually not prescribed for viral infections.  · Drink plenty of fluids, such as water, juice, or warm soup. Fluids loosen mucus so that you can cough it up. This helps you breathe more easily. Fluids also prevent dehydration.  · Make sure you get plenty of rest.  · Do not smoke. Do not allow anyone else to smoke in your home.  Recovery and follow-up  Follow up with your doctor as you are told. You will likely feel better in a week or two. But a dry cough can linger beyond that time. Let your doctor know if you still have symptoms (other than a dry cough) after 2 weeks, or if you’re prone to getting bronchial infections. Take steps to protect yourself from future infections. These steps include stopping smoking and avoiding tobacco smoke, washing your hands often, and getting a yearly flu shot.  When to call your healthcare provider  Call the healthcare provider if you have any of the following:  · Fever of 100.4°F (38.0°C) or higher, or as advised  · Symptoms that get worse, or new symptoms  · Trouble breathing  · Symptoms that don’t start to improve within a week, or within 3 days of taking antibiotics   Date Last Reviewed: 12/1/2016  © 0367-4852 [employer] [address] [facility], [place] [address]. All rights reserved. This information is not intended as a substitute for professional medical care. Always follow your healthcare professional's instructions.            sinus arrythmia

## 2025-07-05 NOTE — ED PROVIDER NOTE - ATTENDING CONTRIBUTION TO CARE
Patient is a 59-year-old male with history of ulcerative colitis on mesalamine who presents emergency department for abdominal pain and inability tolerate p.o.  This been going on for the last week.  Patient recently had a UC flare, this does not feel he has UC flare.  Was previously on prednisone for that.  Does Dors myalgias.  Denies fever, chills, chest pain, shortness of breath.  Had outpatient lab work showing elevated ESR prompting patient's PCP to tell patient to come to the emergency department.    General: uncomfortable, lethargic appearing, no acute distress, overweight  Cardiac: heart RRR, no murmurs, rubs, gallops, pulses 2+ x4  Pulm: lungs CTA  GI: abdomen soft, nontender, negative oscar's, McBurney's, rovsings, rebound, CVA tenderness  Skin: warm, dry, no rash, laceration, abrasion, contusion    will obtain labs, imaging and reeval

## 2025-07-05 NOTE — ED ADULT NURSE NOTE - OBJECTIVE STATEMENT
pt comes into ED A&Ox4, c/o abdominal pain, n/v/d. hasn't eaten in 1 week. hx of colitis and afib, believes it is a colitis flare up as he stopped prednisone ~2 weeks ago. pt takes medications as prescribed. c/o intermittent SOB, pt breathing even and unlabored on room air at this time. denies other complaints of pain or discomfort at this time. pt comes into ED A&Ox4, c/o abdominal pain, n/v/d. hasn't eaten in 1 week. hx of colitis and afib, believes it is a colitis flare up as he stopped prednisone ~2 weeks ago. pt takes medications as prescribed. c/o intermittent SOB, pt breathing even and unlabored on room air at this time. hx of ablation. denies other complaints of pain or discomfort at this time.

## 2025-07-06 DIAGNOSIS — Z98.890 OTHER SPECIFIED POSTPROCEDURAL STATES: Chronic | ICD-10-CM

## 2025-07-06 DIAGNOSIS — I48.0 PAROXYSMAL ATRIAL FIBRILLATION: ICD-10-CM

## 2025-07-06 DIAGNOSIS — Z95.818 PRESENCE OF OTHER CARDIAC IMPLANTS AND GRAFTS: Chronic | ICD-10-CM

## 2025-07-06 DIAGNOSIS — Z01.810 ENCOUNTER FOR PREPROCEDURAL CARDIOVASCULAR EXAMINATION: ICD-10-CM

## 2025-07-06 DIAGNOSIS — R91.1 SOLITARY PULMONARY NODULE: ICD-10-CM

## 2025-07-06 LAB
A1C WITH ESTIMATED AVERAGE GLUCOSE RESULT: 6.7 % — HIGH (ref 4–5.6)
ACTH STIM ACTH BASELINE: 8.7 PG/ML — SIGNIFICANT CHANGE UP (ref 7.2–63.3)
ACTH STIM CORTISOL 30 MIN: 9.2 UG/DL — SIGNIFICANT CHANGE UP
ACTH STIM CORTISOL 60 MIN: 11.3 UG/DL — SIGNIFICANT CHANGE UP
ACTH STIM CORTISOL BASELINE: 4.5 UG/DL — LOW (ref 6–18.4)
ALBUMIN SERPL ELPH-MCNC: 2.8 G/DL — LOW (ref 3.3–5.2)
ALP SERPL-CCNC: 50 U/L — SIGNIFICANT CHANGE UP (ref 40–120)
ALT FLD-CCNC: 9 U/L — SIGNIFICANT CHANGE UP
ANION GAP SERPL CALC-SCNC: 11 MMOL/L — SIGNIFICANT CHANGE UP (ref 5–17)
AST SERPL-CCNC: 12 U/L — SIGNIFICANT CHANGE UP
BILIRUB SERPL-MCNC: 0.3 MG/DL — LOW (ref 0.4–2)
BLD GP AB SCN SERPL QL: SIGNIFICANT CHANGE UP
BUN SERPL-MCNC: 8 MG/DL — SIGNIFICANT CHANGE UP (ref 8–20)
CALCIUM SERPL-MCNC: 8.6 MG/DL — SIGNIFICANT CHANGE UP (ref 8.4–10.5)
CHLORIDE SERPL-SCNC: 105 MMOL/L — SIGNIFICANT CHANGE UP (ref 96–108)
CO2 SERPL-SCNC: 22 MMOL/L — SIGNIFICANT CHANGE UP (ref 22–29)
CORTIS AM PEAK SERPL-MCNC: 4.4 UG/DL — LOW (ref 6–18.4)
CREAT SERPL-MCNC: 0.73 MG/DL — SIGNIFICANT CHANGE UP (ref 0.5–1.3)
EGFR: 104 ML/MIN/1.73M2 — SIGNIFICANT CHANGE UP
EGFR: 104 ML/MIN/1.73M2 — SIGNIFICANT CHANGE UP
ESTIMATED AVERAGE GLUCOSE: 146 MG/DL — HIGH (ref 68–114)
GI PCR PANEL: SIGNIFICANT CHANGE UP
GLUCOSE SERPL-MCNC: 174 MG/DL — HIGH (ref 70–99)
HCT VFR BLD CALC: 33.2 % — LOW (ref 39–50)
HGB BLD-MCNC: 10.1 G/DL — LOW (ref 13–17)
MAGNESIUM SERPL-MCNC: 1.6 MG/DL — SIGNIFICANT CHANGE UP (ref 1.6–2.6)
MCHC RBC-ENTMCNC: 25.8 PG — LOW (ref 27–34)
MCHC RBC-ENTMCNC: 30.4 G/DL — LOW (ref 32–36)
MCV RBC AUTO: 84.7 FL — SIGNIFICANT CHANGE UP (ref 80–100)
NRBC # BLD AUTO: 0 K/UL — SIGNIFICANT CHANGE UP (ref 0–0)
NRBC # FLD: 0 K/UL — SIGNIFICANT CHANGE UP (ref 0–0)
NRBC BLD AUTO-RTO: 0 /100 WBCS — SIGNIFICANT CHANGE UP (ref 0–0)
PLATELET # BLD AUTO: 559 K/UL — HIGH (ref 150–400)
PMV BLD: 9.1 FL — SIGNIFICANT CHANGE UP (ref 7–13)
POTASSIUM SERPL-MCNC: 3.4 MMOL/L — LOW (ref 3.5–5.3)
POTASSIUM SERPL-SCNC: 3.4 MMOL/L — LOW (ref 3.5–5.3)
PROT SERPL-MCNC: 6.1 G/DL — LOW (ref 6.6–8.7)
RBC # BLD: 3.92 M/UL — LOW (ref 4.2–5.8)
RBC # FLD: 16 % — HIGH (ref 10.3–14.5)
SODIUM SERPL-SCNC: 138 MMOL/L — SIGNIFICANT CHANGE UP (ref 135–145)
WBC # BLD: 11.82 K/UL — HIGH (ref 3.8–10.5)
WBC # FLD AUTO: 11.82 K/UL — HIGH (ref 3.8–10.5)

## 2025-07-06 PROCEDURE — 85027 COMPLETE CBC AUTOMATED: CPT

## 2025-07-06 PROCEDURE — 87507 IADNA-DNA/RNA PROBE TQ 12-25: CPT

## 2025-07-06 PROCEDURE — 71045 X-RAY EXAM CHEST 1 VIEW: CPT

## 2025-07-06 PROCEDURE — 85730 THROMBOPLASTIN TIME PARTIAL: CPT

## 2025-07-06 PROCEDURE — 0241U: CPT

## 2025-07-06 PROCEDURE — 83993 ASSAY FOR CALPROTECTIN FECAL: CPT

## 2025-07-06 PROCEDURE — 85610 PROTHROMBIN TIME: CPT

## 2025-07-06 PROCEDURE — 99222 1ST HOSP IP/OBS MODERATE 55: CPT

## 2025-07-06 PROCEDURE — 83036 HEMOGLOBIN GLYCOSYLATED A1C: CPT

## 2025-07-06 PROCEDURE — 83735 ASSAY OF MAGNESIUM: CPT

## 2025-07-06 PROCEDURE — 86900 BLOOD TYPING SEROLOGIC ABO: CPT

## 2025-07-06 PROCEDURE — 82024 ASSAY OF ACTH: CPT

## 2025-07-06 PROCEDURE — 83690 ASSAY OF LIPASE: CPT

## 2025-07-06 PROCEDURE — 99223 1ST HOSP IP/OBS HIGH 75: CPT

## 2025-07-06 PROCEDURE — 99232 SBSQ HOSP IP/OBS MODERATE 35: CPT

## 2025-07-06 PROCEDURE — 99233 SBSQ HOSP IP/OBS HIGH 50: CPT

## 2025-07-06 PROCEDURE — 99221 1ST HOSP IP/OBS SF/LOW 40: CPT

## 2025-07-06 PROCEDURE — 93005 ELECTROCARDIOGRAM TRACING: CPT

## 2025-07-06 PROCEDURE — 82533 TOTAL CORTISOL: CPT

## 2025-07-06 PROCEDURE — 85025 COMPLETE CBC W/AUTO DIFF WBC: CPT

## 2025-07-06 PROCEDURE — 85652 RBC SED RATE AUTOMATED: CPT

## 2025-07-06 PROCEDURE — 87493 C DIFF AMPLIFIED PROBE: CPT

## 2025-07-06 PROCEDURE — 80053 COMPREHEN METABOLIC PANEL: CPT

## 2025-07-06 PROCEDURE — 74177 CT ABD & PELVIS W/CONTRAST: CPT

## 2025-07-06 PROCEDURE — 81001 URINALYSIS AUTO W/SCOPE: CPT

## 2025-07-06 PROCEDURE — 86850 RBC ANTIBODY SCREEN: CPT

## 2025-07-06 PROCEDURE — 86901 BLOOD TYPING SEROLOGIC RH(D): CPT

## 2025-07-06 PROCEDURE — 71260 CT THORAX DX C+: CPT

## 2025-07-06 PROCEDURE — 36415 COLL VENOUS BLD VENIPUNCTURE: CPT

## 2025-07-06 RX ORDER — GLUCAGON 3 MG/1
1 POWDER NASAL ONCE
Refills: 0 | Status: DISCONTINUED | OUTPATIENT
Start: 2025-07-06 | End: 2025-07-07

## 2025-07-06 RX ORDER — ROSUVASTATIN CALCIUM 20 MG/1
1 TABLET, FILM COATED ORAL
Refills: 0 | DISCHARGE

## 2025-07-06 RX ORDER — INSULIN LISPRO 100 U/ML
INJECTION, SOLUTION INTRAVENOUS; SUBCUTANEOUS
Refills: 0 | Status: DISCONTINUED | OUTPATIENT
Start: 2025-07-06 | End: 2025-07-07

## 2025-07-06 RX ORDER — MIDODRINE HYDROCHLORIDE 5 MG/1
10 TABLET ORAL THREE TIMES A DAY
Refills: 0 | Status: DISCONTINUED | OUTPATIENT
Start: 2025-07-06 | End: 2025-07-07

## 2025-07-06 RX ORDER — METHYLPREDNISOLONE ACETATE 80 MG/ML
60 INJECTION, SUSPENSION INTRA-ARTICULAR; INTRALESIONAL; INTRAMUSCULAR; SOFT TISSUE DAILY
Refills: 0 | Status: DISCONTINUED | OUTPATIENT
Start: 2025-07-06 | End: 2025-07-07

## 2025-07-06 RX ORDER — MAGNESIUM SULFATE 500 MG/ML
1 SYRINGE (ML) INJECTION ONCE
Refills: 0 | Status: COMPLETED | OUTPATIENT
Start: 2025-07-06 | End: 2025-07-06

## 2025-07-06 RX ORDER — DEXTROSE 50 % IN WATER 50 %
25 SYRINGE (ML) INTRAVENOUS ONCE
Refills: 0 | Status: DISCONTINUED | OUTPATIENT
Start: 2025-07-06 | End: 2025-07-07

## 2025-07-06 RX ORDER — DILTIAZEM HYDROCHLORIDE 240 MG/1
30 TABLET, EXTENDED RELEASE ORAL
Refills: 0 | Status: DISCONTINUED | OUTPATIENT
Start: 2025-07-06 | End: 2025-07-07

## 2025-07-06 RX ORDER — ENOXAPARIN SODIUM 100 MG/ML
40 INJECTION SUBCUTANEOUS EVERY 24 HOURS
Refills: 0 | Status: DISCONTINUED | OUTPATIENT
Start: 2025-07-06 | End: 2025-07-06

## 2025-07-06 RX ORDER — DEXTROSE 50 % IN WATER 50 %
12.5 SYRINGE (ML) INTRAVENOUS ONCE
Refills: 0 | Status: DISCONTINUED | OUTPATIENT
Start: 2025-07-06 | End: 2025-07-07

## 2025-07-06 RX ORDER — ACETAMINOPHEN 500 MG/5ML
1000 LIQUID (ML) ORAL ONCE
Refills: 0 | Status: COMPLETED | OUTPATIENT
Start: 2025-07-06 | End: 2025-07-06

## 2025-07-06 RX ORDER — SODIUM CHLORIDE 9 G/1000ML
1000 INJECTION, SOLUTION INTRAVENOUS
Refills: 0 | Status: DISCONTINUED | OUTPATIENT
Start: 2025-07-06 | End: 2025-07-07

## 2025-07-06 RX ORDER — DILTIAZEM HYDROCHLORIDE 240 MG/1
5 TABLET, EXTENDED RELEASE ORAL EVERY 4 HOURS
Refills: 0 | Status: DISCONTINUED | OUTPATIENT
Start: 2025-07-06 | End: 2025-07-06

## 2025-07-06 RX ORDER — METOPROLOL SUCCINATE 50 MG/1
5 TABLET, EXTENDED RELEASE ORAL EVERY 6 HOURS
Refills: 0 | Status: DISCONTINUED | OUTPATIENT
Start: 2025-07-06 | End: 2025-07-07

## 2025-07-06 RX ORDER — TAMSULOSIN HYDROCHLORIDE 0.4 MG/1
0.4 CAPSULE ORAL
Refills: 0 | DISCHARGE

## 2025-07-06 RX ORDER — SODIUM CHLORIDE 9 G/1000ML
500 INJECTION, SOLUTION INTRAVENOUS ONCE
Refills: 0 | Status: COMPLETED | OUTPATIENT
Start: 2025-07-06 | End: 2025-07-06

## 2025-07-06 RX ORDER — TAMSULOSIN HYDROCHLORIDE 0.4 MG/1
0.4 CAPSULE ORAL AT BEDTIME
Refills: 0 | Status: DISCONTINUED | OUTPATIENT
Start: 2025-07-06 | End: 2025-07-07

## 2025-07-06 RX ORDER — DEXTROSE 50 % IN WATER 50 %
15 SYRINGE (ML) INTRAVENOUS ONCE
Refills: 0 | Status: DISCONTINUED | OUTPATIENT
Start: 2025-07-06 | End: 2025-07-07

## 2025-07-06 RX ORDER — MAGNESIUM SULFATE 500 MG/ML
2 SYRINGE (ML) INJECTION ONCE
Refills: 0 | Status: COMPLETED | OUTPATIENT
Start: 2025-07-06 | End: 2025-07-06

## 2025-07-06 RX ADMIN — Medication 40 MILLIEQUIVALENT(S): at 12:55

## 2025-07-06 RX ADMIN — TAMSULOSIN HYDROCHLORIDE 0.4 MILLIGRAM(S): 0.4 CAPSULE ORAL at 22:49

## 2025-07-06 RX ADMIN — TAMSULOSIN HYDROCHLORIDE 0.4 MILLIGRAM(S): 0.4 CAPSULE ORAL at 02:15

## 2025-07-06 RX ADMIN — Medication 4 MILLIGRAM(S): at 13:41

## 2025-07-06 RX ADMIN — Medication 650 MILLIGRAM(S): at 05:27

## 2025-07-06 RX ADMIN — Medication 4 MILLIGRAM(S): at 05:27

## 2025-07-06 RX ADMIN — DILTIAZEM HYDROCHLORIDE 30 MILLIGRAM(S): 240 TABLET, EXTENDED RELEASE ORAL at 17:44

## 2025-07-06 RX ADMIN — Medication 40 MILLIEQUIVALENT(S): at 09:06

## 2025-07-06 RX ADMIN — Medication 4 MILLIGRAM(S): at 06:57

## 2025-07-06 RX ADMIN — Medication 25 GRAM(S): at 06:58

## 2025-07-06 RX ADMIN — Medication 100 GRAM(S): at 09:06

## 2025-07-06 RX ADMIN — DILTIAZEM HYDROCHLORIDE 30 MILLIGRAM(S): 240 TABLET, EXTENDED RELEASE ORAL at 05:27

## 2025-07-06 RX ADMIN — Medication 1500 MILLIGRAM(S): at 03:13

## 2025-07-06 RX ADMIN — Medication 1500 MILLIGRAM(S): at 17:44

## 2025-07-06 RX ADMIN — METHYLPREDNISOLONE ACETATE 60 MILLIGRAM(S): 80 INJECTION, SUSPENSION INTRA-ARTICULAR; INTRALESIONAL; INTRAMUSCULAR; SOFT TISSUE at 17:43

## 2025-07-06 RX ADMIN — ROSUVASTATIN CALCIUM 20 MILLIGRAM(S): 20 TABLET, FILM COATED ORAL at 22:49

## 2025-07-06 RX ADMIN — Medication 650 MILLIGRAM(S): at 06:25

## 2025-07-06 RX ADMIN — Medication 4 MILLIGRAM(S): at 14:41

## 2025-07-06 RX ADMIN — SODIUM CHLORIDE 1000 MILLILITER(S): 9 INJECTION, SOLUTION INTRAVENOUS at 03:03

## 2025-07-06 RX ADMIN — Medication 4 MILLIGRAM(S): at 07:11

## 2025-07-06 NOTE — CONSULT NOTE ADULT - PROBLEM SELECTOR RECOMMENDATION 2
- hx of AFib s/p ablation/watchman   - episodes of PAF on telemetry and 3 beats NSVT   - continue with Cardizem 30 mg BID  - PRN Cardizem 5 mg IV push PRN for sustained HR>120

## 2025-07-06 NOTE — CONSULT NOTE ADULT - SUBJECTIVE AND OBJECTIVE BOX
Chief Complaint:  Patient is a 59y old  Male who presents with a chief complaint of Abdominal pain, hypotension (06 Jul 2025 13:24)    HPI:  This is a 59y Male with a past medical history significant for UC ( on mesalamine), CHF who presents with N/V/diarrhea, body aches.   History per patient and wife at bedside  Patient reports long standing UC diagnosis. He has been maintained on PO mesalamine and has multiple flares requiring PO prednisone. He reports a flare about 2 months ago and stopping the prednisone about 6 weeks ago. He had been feeling well until the past week where he developed body aches, nausea and PO intolerance. He also notes have loose wattery stool 3 times per day, nonbloody. His prior flares have never presented with nausea or PO intolerance. His usual stools are formed and occur once per day. Considering this, he presented for evaluation. Of note, takes midodrine at home for hypotension.  Denies prior enterography.         REVIEW OF SYSTEMS:   12 point review of systems completed and negative other than as stated above    PAST MEDICAL & SURGICAL HISTORY:  Ulcerative colitis  Chronic sinusitis  Afib  2015  Sleep apnea  not using c-pap  Lung nodules  watching  Renal calculus  current  [place] (hard of hearing), bilateral  DDD (degenerative disc disease), lumbar  Broken ribs  left posterior 9th and 10th  History of colonoscopy  Hernia  inguinal left  S/P ablation of atrial flutter  Presence of Watchman left atrial appendage closure device    MEDICATIONS:   MEDICATIONS  (STANDING):  diltiazem    Tablet 30 milliGRAM(s) Oral two times a day  mesalamine ER Capsule 1500 milliGRAM(s) Oral two times a day  rosuvastatin 20 milliGRAM(s) Oral at bedtime  tamsulosin 0.4 milliGRAM(s) Oral at bedtime    MEDICATIONS  (PRN):  acetaminophen     Tablet .. 650 milliGRAM(s) Oral every 6 hours PRN Temp greater or equal to 38C (100.4F), Mild Pain (1 - 3)  aluminum hydroxide/magnesium hydroxide/simethicone Suspension 30 milliLiter(s) Oral every 4 hours PRN Dyspepsia  diltiazem Injectable 5 milliGRAM(s) IV Push every 4 hours PRN sustained HR >120  melatonin 3 milliGRAM(s) Oral at bedtime PRN Insomnia  midodrine. 10 milliGRAM(s) Oral three times a day PRN SBP <100  ondansetron Injectable 4 milliGRAM(s) IV Push every 8 hours PRN Nausea and/or Vomiting    ALLERGIES:   Allergies  penicillin (Unknown)  penicillins (Unknown)  Intolerances    Social Hx:     Family History: Denies FH liver or GI issues, including malignancy      PHYSICAL EXAM:  VITAL SIGNS:   Vital Signs Last 24 Hrs  T(C): 37.1 (06 Jul 2025 13:47), Max: 37.3 (06 Jul 2025 13:36)  T(F): 98.7 (06 Jul 2025 13:47), Max: 99.1 (06 Jul 2025 13:36)  HR: 82 (06 Jul 2025 13:36) (63 - 104)  BP: 122/80 (06 Jul 2025 13:36) (99/65 - 122/80)  BP(mean): --  RR: 18 (06 Jul 2025 13:36) (18 - 20)  SpO2: 94% (06 Jul 2025 13:36) (89% - 98%)    Parameters below as of 06 Jul 2025 13:36  Patient On (Oxygen Delivery Method): room air      I&O's Summary    GENERAL:  No acute distress  HEENT:  NC/AT, conjunctiva clear, sclera anicteric  CHEST:  No increased effort  HEART:  Regular rate  ABDOMEN:  Soft, non-tender, non-distended, no rebound or guarding  EXTREMITIES: No edema  SKIN:  Warm, dry  NEURO:  Calm, cooperative    LABS:                        10.1   11.82 )-----------( 559      ( 06 Jul 2025 03:44 )             33.2     07-06    138  |  105  |  8.0  ----------------------------<  174[H]  3.4[L]   |  22.0  |  0.73    Ca    8.6      06 Jul 2025 03:44  Mg     1.6     07-06    TPro  6.1[L]  /  Alb  2.8[L]  /  TBili  0.3[L]  /  DBili  x   /  AST  12  /  ALT  9   /  AlkPhos  50  07-06    LIVER FUNCTIONS - ( 06 Jul 2025 03:44 )  Alb: 2.8 g/dL / Pro: 6.1 g/dL / ALK PHOS: 50 U/L / ALT: 9 U/L / AST: 12 U/L / GGT: x           PT/INR - ( 05 Jul 2025 13:57 )   PT: 13.0 sec;   INR: 1.12 ratio         PTT - ( 05 Jul 2025 13:57 )  PTT:34.1 sec    Urinalysis with Rflx Culture (collected 05 Jul 2025 15:12)          RADIOLOGY & ADDITIONAL STUDIES:  I personally reviewed the studies and agree with the radiologists review  ACC: [id number] EXAM:  CT ABDOMEN AND PELVIS IC   ORDERED BY: RONNY BRAND   ACC: [id number] EXAM:  CT CHEST IC   ORDERED BY: RONNY BRAND   PROCEDURE DATE:  07/05/2025      INTERPRETATION:  CLINICAL INFORMATION: Pleural effusion. Shortness of   breath. Abdominal pain.    COMPARISON: PET/CT 4/24/2024. CT abdomen and pelvis 12/15/2014.  CONTRAST/COMPLICATIONS:  IV Contrast: Omnipaque 350 (accession [id number]), IV contrast documented   in unlinked concurrent exam (accession [id number]) 90 cc administered   10   cc discarded  Oral Contrast: NONE.    PROCEDURE:  CT of the Chest, Abdomen and Pelvis was performed.  Sagittal and coronal reformats were performed.    FINDINGS:  CHEST:  LUNGS AND LARGE AIRWAYS: Patent central airways. 12 mm spiculated nodule   right upper lobe overlying the fissure, developed since prior study.  PLEURA: No pleural effusion. No pneumothorax.  VESSELS: No thoracic aortic aneurysm. No central pulmonary emboli.  HEART: Heart size is normal. Left atrial closure device. No pericardial   effusion.  MEDIASTINUM AND IVAN: Decrease in size of mild mediastinal   lymphadenopathy. Many of the lymph nodes are again calcified.  CHEST WALL AND LOWER NECK: Within normal limits.    ABDOMEN AND PELVIS:  LIVER: Mild hepatic steatosis.  BILE DUCTS: Normal caliber.  GALLBLADDER: Within normal limits.  SPLEEN: Within normal limits.  PANCREAS: Within normal limits.  ADRENALS: Within normal limits.  KIDNEYS/URETERS: 4 mm nonobstructive calculus upper pole right kidney. 3   mm nonobstructive calculus left kidney. No hydronephrosis or ureteral   calculi. Subcentimeter hypodense renal lesions bilaterally, too small to   characterize, probably cysts.    BLADDER: Irregular shaped bladder with urachal remnant at the dome and   lobulated/diverticular contour and the posterior aspect.  REPRODUCTIVE ORGANS: No significant prostatic enlargement.    BOWEL: Moderate size hiatal hernia containing gastric fundus and upper   body. No bowel obstruction. Appendix is normal. Colonic diverticulosis.   No evidence of acute diverticulitis. Prominent submucosal fat in the   colon and rectum could be a result of chronic inflammatory bowel disease.   Mild wall thickening of transverse colon.  PERITONEUM/RETROPERITONEUM: Within normal limits.  VESSELS: Atherosclerotic changes. No abdominal aortic aneurysm.  LYMPH NODES: Borderline sized portal caval lymph nodes. Otherwise, no   retroperitoneal lymphadenopathy.  ABDOMINAL WALL: 6.5 cm fat-containing umbilical hernia.  BONES: Degenerative changes.    IMPRESSION:    12 mm right upper lobe lung nodule, suspicious for malignancy. No pleural   effusion.    Findings suggestive chronic inflammatory bowel disease in the colon.   Cannot exclude mild acute colitis of the transverse colon.    Moderate size hiatal hernia containing gastric fundus and upper body. No   gastric outlet obstruction. No bowel obstruction.    --- End of Report ---            GABRIELA NAVARRETE MD; Attending Radiologist  This document has been electronically signed. Jul 5 2025  6:13PM  07-05-25 @ 17:51

## 2025-07-06 NOTE — PROGRESS NOTE ADULT - ASSESSMENT
Patient is a 59 year old male with history of UC on mesalamine SALLY, afib s/p ablation, CHF of unknown type presenting with 1 week of abdominal pain/inability to tolerate PO also with right flank pain. Was found to be hypotensive in ED and was tested for possible adrenal insufficiency. Patient showing colitis on CT scan.     #Colitis 2/2 UC flair up vs infectious colitis  #hematochezia   -CT abdomen/pelvis: findings suggestive chronic inflammatory bowel disease in the colon, moderate size hiatal hernia containing gastric fundus and upper body  -Defer antibiotics at this time due to lack of fevers and suspicion of UC etiology; start antibiotics if GI PCR is positive  -Patient with leukocytosis supports possible infectious etiology but negative for fevers  -GI consulted and recommended GI-PCR before starting steroids  -C. diff negative  -GI-PCR pending  -s/p morphine in ED, abdominal pain controlled  -Reported bloody bowel movement in ED, hold AC until negative for hematochezia  -Continue mesalamine  -Consider advancing diet pending permission of GI    #Borderline blood pressure 2/2 chronic orthostatics vs adrenal insufficiency  #Generalized weakness  -Continue midodrine   -Unsure of reason for midodrine possibly due to orthostatic hypotension.  -Cosyntropin test performed for potential adrenal insufficiency, results positive  -Hold steroids due to concern of infectious etiology    #HLD  -Continue rosuvastatin    Afib s/p watchman and ablation  -Continue diltiazem  -Give IV diltiazem 5mg PRN for sustained episodes of afib  -Not on AC since watchman was performed   -Goes into afib paroxysmally but not sustaining; possibly due to hypotension or electrolyte abnormalities  -Check mag and potassium    CHF  -Unsure of type  -Hold torsemide due to hypotension    #Hypokalemia  -Chronic, on potassium chloride at home  -K+ given in ED; K+ raised from 3.3 to 3.4   -Continue potassium chloride tablets       #Lung nodule  -CT scan 7/5: 12 mm right upper lobe lung nodule, suspicious for malignancy  -Patient informed  -Consult thoracic surgery due to due to spiculation, size and hx of smoking     #Potential Adrenal Insufficiency  -Cosyntropin stimulation test positive   -Consult endocrinology    #BPH  -Continue tamsulosin    #SALLY  -On bipap for tonight  -to bring in home machine     DVT prophylaxis: hold for time being due to reported hematochezia, can resume if patient has non bloody bowel movement    dispo: admit for 1-2 days pending GI PCR and resolution of hypotension.  Patient is a 59 year old male with history of UC on mesalamine SALLY, afib s/p ablation, CHF of unknown type presenting with 1 week of abdominal pain/inability to tolerate PO also with right flank pain. Was found to be hypotensive in ED and was tested for possible adrenal insufficiency. Patient showing colitis on CT scan.     #Colitis 2/2 UC flair up vs infectious colitis  #hematochezia   -CT abdomen/pelvis: findings suggestive chronic inflammatory bowel disease in the colon, moderate size hiatal hernia containing gastric fundus and upper body  -Defer antibiotics at this time due to lack of fevers and suspicion of UC etiology; start antibiotics if GI PCR is positive  -Patient with leukocytosis supports possible infectious etiology but negative for fevers  -GI consulted and recommended GI-PCR before starting steroids  -C. diff negative  -GI-PCR pending  -s/p morphine in ED, abdominal pain controlled  -Reported bloody bowel movement in ED, hold AC until negative for hematochezia  -Continue mesalamine  -Consider advancing diet pending permission of GI    #Borderline blood pressure 2/2 chronic orthostatics vs adrenal insufficiency  #Generalized weakness  -Continue midodrine   -Unsure of reason for midodrine possibly due to orthostatic hypotension.  -Cosyntropin test performed for potential adrenal insufficiency, results positive  -Hold steroids due to concern of infectious etiology    #HLD  -Continue rosuvastatin    Afib s/p watchman and ablation  -Continue diltiazem  -Give IV diltiazem 5mg PRN for sustained episodes of afib  -Not on AC since watchman was performed   -Goes into afib paroxysmally but not sustaining; possibly due to hypotension or electrolyte abnormalities  -Check mag and potassium    CHF  -Unsure of type  -Hold torsemide due to hypotension    #Hypokalemia  -Chronic, on potassium chloride at home  -K+ given in ED; K+ raised from 3.3 to 3.4   -Continue potassium chloride tablets     #Lung nodule  -CT scan 7/5: 12 mm right upper lobe lung nodule, suspicious for malignancy  -Patient informed  -Consult thoracic surgery due to due to spiculation, size and hx of smoking    #Potential Adrenal Insufficiency  -Cosyntropin stimulation test positive   -Consult endocrinology    #BPH  -Continue tamsulosin    #SALLY  -On bipap for tonight  -to bring in home machine     DVT prophylaxis: hold for time being due to reported hematochezia, can resume if patient has non bloody bowel movement    dispo: admit for 1-2 days pending GI PCR and resolution of hypotension.  Patient is a 59 year old male with history of UC on mesalamine SALLY, afib s/p ablation, CHF of unknown type admitted for colitis 2/2 UC flare vs infectious. Also ruling out potential adrenal insufficiency.    #Colitis 2/2 UC flare up vs infectious colitis  #hematochezia -resolved  #leukocytosis 2/2 reactive vs infectious  -CT A/P consistent with transverse colitis  -Defer antibiotics at this time due to lack of fevers and suspicion of UC etiology; start antibiotics if GI PCR is positive  -C. diff negative  -GI-PCR pending  -Continue mesalamine 1500mg BID  -morphing 2mg/4mg q6hrs PRN for mod/severe pain  -GI consulted, f/u recs    #Borderline blood pressure 2/2 chronic orthostatics vs adrenal insufficiency  #Potential Adrenal Insufficiency  -on midodrine at home, unsure of reason  -Continue midodrine 10mg TID PRN for SBP <100  -Cosyntropin stimulation test positive  -Morning cortisol low (4.4)  -Endo consulted, f/u recs    #Lung nodule  -CT scan 7/5: 12 mm right upper lobe lung nodule, suspicious for malignancy  -Patient informed  -Thoracic surgery consulted, recs appreciated  -planning for biopsy via bronch this admission pending medical and cardiology clearance  -cardiology consulted for clearance    #Afib s/p watchman and ablation  -Continue diltiazem 30mg BID  -diltiazem 5mg IV PRN for sustained HR >120  -Goes into afib paroxysmally but not sustaining; possibly due to hypotension or electrolyte abnormalities    CHF  -Unsure of type  -Holding home torsemide due to hypotension episode    #Hypokalemia  -Chronic, on potassium chloride at home  -Continue to monitor and replete as needed    #BPH  -Continue tamsulosin 0.4mg QHS    #HLD  -Continue rosuvastatin 20mg QHS    #SALLY  -On nocturnal bipap  -to bring in home machine     DVT prophylaxis: hold for time due to potential procedure  Dispo: Acute

## 2025-07-06 NOTE — CONSULT NOTE ADULT - ASSESSMENT
59 year old male with history of UC on mesalamine, SALLY, afib s/p ablation, CHF of unknown type presenting with 1 week of abdominal pain/inability to tolerate PO. Patient has also been feeling very weak during this time. Patient had flare of UC around 2 weeks ago and was treated with prednisone. Now admitted with colitis UC vs infectious  On CT c/a/p found to have a 12 mm RUL nodule suspicious for malignancy      #RUL lung nodule  -CT surgery follow, plan for ION bronch    #anemia  #thrombocytosis - could be reactive  -check iron, TIBC, ferritin      IN PROGRESS 59 year old male with history of UC on mesalamine, SALLY, afib s/p ablation, CHF of unknown type presenting with 1 week of abdominal pain/inability to tolerate PO. Patient has also been feeling very weak during this time. Patient had flare of UC around 2 weeks ago and was treated with prednisone. Now admitted with colitis UC vs infectious  On CT c/a/p found to have a 12 mm RUL nodule suspicious for malignancy      #RUL lung nodule  -CT surgery follow, plan for ION bronch    #anemia  #thrombocytosis - could be reactive  -check iron, TIBC, ferritin

## 2025-07-06 NOTE — CONSULT NOTE ADULT - ASSESSMENT
58 y/o M with hx of UC on mesalamine, SALLY, AFib s/p ablation and watchman, and diastolic heart failure who presented ot he ED with complaints of abdominal pain/inability to tolerate PO with intermittent vomiting for one week. Patient was admitted for colitis secondary to UC flare vs infectious etiology. Patient is getting ruled out for potential adrenal insuffiencey. Cardiology consulted for cardiac risk stratification

## 2025-07-06 NOTE — CONSULT NOTE ADULT - CONSULT REASON
cardiac risk stratification
Ulcerative Colitis
lung nodule
Question of adrenal insufficiency
RUL lung nodule

## 2025-07-06 NOTE — CONSULT NOTE ADULT - SUBJECTIVE AND OBJECTIVE BOX
Patient is a 59y old  Male who presents with a chief complaint of Abdominal pain, hypotension     HPI:  Patient is a 59 year old male with history of UC on mesalamine, SALLY, afib s/p ablation, CHF of unknown type presenting with 1 week of abdominal pain/inability to tolerate PO.   Patient has also been feeling very weak during this time. Patient had flare of UC around 2 weeks ago and was treated with prednisone Patient completed self-taper which he has done in the past.   Patient now experiencing abdominal pain and having soft stool. Patient states he has also been feeling some shortness of breath recently. Patient indicating abdominal pain in left lower quadrant.   Patient also states pain has been in right flank as well. Patient states with last bowel movement may have had some blood. Of note patient on midodrine at home states his blood pressure does sometimes run low.   Patient states to have CHF but does not know what type given that he is on midodrine likely HFrEF. Patient has also been vomiting.   Denied Chest pain,  fevers, chills, nausea, diarrhea, dysuria, headache, dizziness. Patient found to be hypotensive in the ED with concern for adrenal insufficiency.   Patient CT showing pulmonary nodule and transverse colitis.    patient has been in A fib occasionally and will go to 140s but then return to baseline  bpm.      Endo consulted for question of adrenal insufficiency.  Per patient he has been on and off steroids for 20 years, mostly on it for months at a  time.  Recently he was told that he might not need steroids and recommended to wean off, he says he was on steroids for 2 months recently prednisone 40 mg daily,   then weaned off over 1 months, last dose was 2 weeks ago.  He says he felt fine for a week, no n/v, no LOC, no dizziness.  But since last week he had been having some abdominal pain and nausea with food.        PAST MEDICAL & SURGICAL HISTORY:  Ulcerative colitis    Chronic sinusitis    Afib  2015    Sleep apnea  not using c-pap    Lung nodules  watching    Renal calculus  current    [place] (hard of hearing), bilateral    DDD (degenerative disc disease), lumbar    Broken ribs  left posterior 9th and 10th    History of colonoscopy    Hernia  inguinal left    S/P ablation of atrial flutter    Presence of Watchman left atrial appendage closure device        Social History:      FAMILY HISTORY:  No pertinent family history in first degree relatives          Allergies    penicillin (Unknown)  penicillins (Unknown)    Intolerances        REVIEW OF SYSTEMS:    complains of nausea, abdominal pain, no vomiting, no SOB, no cp, no LOC, no dizziness.        MEDICATIONS  (STANDING):  diltiazem    Tablet 30 milliGRAM(s) Oral two times a day  mesalamine ER Capsule 1500 milliGRAM(s) Oral two times a day  methylPREDNISolone sodium succinate Injectable 60 milliGRAM(s) IV Push daily  rosuvastatin 20 milliGRAM(s) Oral at bedtime  tamsulosin 0.4 milliGRAM(s) Oral at bedtime    MEDICATIONS  (PRN):  acetaminophen     Tablet .. 650 milliGRAM(s) Oral every 6 hours PRN Temp greater or equal to 38C (100.4F), Mild Pain (1 - 3)  aluminum hydroxide/magnesium hydroxide/simethicone Suspension 30 milliLiter(s) Oral every 4 hours PRN Dyspepsia  diltiazem Injectable 5 milliGRAM(s) IV Push every 4 hours PRN sustained HR >120  melatonin 3 milliGRAM(s) Oral at bedtime PRN Insomnia  midodrine. 10 milliGRAM(s) Oral three times a day PRN SBP <100  ondansetron Injectable 4 milliGRAM(s) IV Push every 8 hours PRN Nausea and/or Vomiting      Vital Signs Last 24 Hrs  T(C): 37.1 (06 Jul 2025 16:28), Max: 37.3 (06 Jul 2025 13:36)  T(F): 98.8 (06 Jul 2025 16:28), Max: 99.1 (06 Jul 2025 13:36)  HR: 84 (06 Jul 2025 16:28) (81 - 104)  BP: 118/77 (06 Jul 2025 16:28) (101/67 - 122/80)  BP(mean): --  RR: 18 (06 Jul 2025 16:28) (18 - 20)  SpO2: 93% (06 Jul 2025 16:28) (89% - 98%)    Parameters below as of 06 Jul 2025 16:28  Patient On (Oxygen Delivery Method): room air          PHYSICAL EXAM:    Constitutional: NAD, well-groomed, well-developed  HEENT: PERRL,  no exophalmos  Respiratory: CTAB  Cardiovascular: S1 and S2, RRR, no M/G/R  Gastrointestinal: BS+, soft, no organomegaly  Neurological: A/O x 3, no focal deficits  Psychiatric: Normal mood, normal affect  Skin: No rashes, no acanthosis        LABS  07-06    138  |  105  |  8.0  ----------------------------<  174[H]  3.4[L]   |  22.0  |  0.73    Ca    8.6      06 Jul 2025 03:44  Mg     1.6     07-06    TPro  6.1[L]  /  Alb  2.8[L]  /  TBili  0.3[L]  /  DBili  x   /  AST  12  /  ALT  9   /  AlkPhos  50  07-06                          10.1   11.82 )-----------( 559      ( 06 Jul 2025 03:44 )             33.2       A1C with Estimated Average Glucose Result: 6.7 % (07-06-25 @ 03:44)          Alkaline Phosphatase: 50 U/L (07-06-25 @ 03:44)  Albumin: 2.8 g/dL (07-06-25 @ 03:44)  Aspartate Aminotransferase (AST/SGOT): 12 U/L (07-06-25 @ 03:44)  Alanine Aminotransferase (ALT/SGPT): 9 U/L (07-06-25 @ 03:44)  Alanine Aminotransferase (ALT/SGPT): 10 U/L (07-05-25 @ 13:57)  Albumin: 3.5 g/dL (07-05-25 @ 13:57)  Aspartate Aminotransferase (AST/SGOT): 17 U/L (07-05-25 @ 13:57)  Alkaline Phosphatase: 63 U/L (07-05-25 @ 13:57)      CAPILLARY BLOOD GLUCOSE

## 2025-07-06 NOTE — CONSULT NOTE ADULT - ASSESSMENT
Patient is a 59 year old male with a PMHx of Ulcerative Colitis on mesalamine, SALLY, atrial fibrillation s/p Watchman and ablation, CHF, BPH, lung nodules who presented to the ED on 7/5 afternoon with c/o 1 week of abdominal pain and inability to tolerate PO intake. Pt stated that he had an UC flare 2 weeks ago which was treated with prednisone. Patient stated that he may have had some blood with his last bowel movement prior to admission.  Admitted for colitis workup as well as adrenal insufficiency workup. Incidental finding of 12cm RUL nodule suspicious for malignancy. Thoracic surgery called to evaluate. Patient states that he has history of lung nodules that were surveilled in past by his pulmonologist, but he was told that they were stable and monitoring was stopped approx 1-2 years ago. Patient is a former smoker quit 25 years ago after smoking up to 3PPD for 25 years.

## 2025-07-06 NOTE — CONSULT NOTE ADULT - SUBJECTIVE AND OBJECTIVE BOX
[facility]                                              CARDIOLOGY AT [address], [place] [address]                                             Telephone: [phone]. Fax:[phone]                                                       CARDIOLOGY CONSULTATION NOTE                                                                                             History obtained by: Patient and medical record  Community Cardiologist: Dr Canales ([facility])    obtained: Yes [  ] No [ x ]  Reason for Consultation: cardiac risk stratification   Available out pt records reviewed: Yes [  x ] No [  ]    Chief complaint:    Patient is a 59y old  Male who presents with a chief complaint of Abdominal pain, hypotension (06 Jul 2025 13:24)      HPI:  60 y/o M with hx of UC on mesalamine, SALLY, Afib s/p ablation and watchman, diastolic heart failure who presented to the ED with compalints of abdominal pain/inability to tolerate PO with intermittent vomiting for one week. Patient has also been feeling very weak during this time. Patient had flare of UC around 2 weeks ago and was treated with prednisone Patient completed self-taper which he has done in the past. Patient now experiencing abdominal pain and having soft stool. Patient states he has also been feeling some shortness of breath recently. Patient indicating abdominal pain in left lower quadrant. Patient also states pain has been in right flank as well. Patient states with last bowel movement may have had some blood. Of note patient on midodrine at home states his blood pressure does sometimes run low.  Patient found to be hypotensive in the ED with concern for adrenal insufficiency. Patient CT showing pulmonary nodule and transverse colitis. Endocrinology consulted and recommended to do a ATCH stimulation test. On telemetry, patient intermittently goes in and out of afib.   Patient denies chest pain, fevers, chills, nausea, diarrhea, dysuria, headache, dizziness, N/V/D, and recent sick contact.     CARDIAC TESTING   ECHO: 7/5/2023 EF 60-65%, mild RV enlargement, LA volume index 51. Mild MR, mild to moderate TR.  STRESS: none     CATH:  1/2024 normal coronaries     ELECTROPHYSIOLOGY: had an ablation and watchman procedure one year ago 2024     PAST MEDICAL HISTORY  Ulcerative colitis  Chronic sinusitis  Afib  Sleep apnea  Lung nodules  Renal calculus  [place] (hard of hearing), bilateral  DDD (degenerative disc disease), lumbar  Broken ribs    PAST SURGICAL HISTORY  History of colonoscopy  Hernia  S/P ablation of atrial flutter  Presence of Watchman left atrial appendage closure device    SOCIAL HISTORY:  Denies smoking/alcohol/drugs  CIGARETTES:     ALCOHOL:  DRUGS:    FAMILY HISTORY:  No pertinent family history in first degree relatives    Family History of Cardiovascular Disease:  Yes [  ] No [ x ]  Coronary Artery Disease in first degree relative: Yes [  ] No [x  ]  Sudden Cardiac Death in First degree relative: Yes [  ] No [x  ]    HOME MEDICATIONS:  dilTIAZem 30 mg oral tablet: 1 tab(s) orally 2 times a day (05 Jul 2025 23:48)  econazole topical: 1 application 2 times a day (05 Jul 2025 23:48)  mesalamine 500 mg oral capsule, extended release: 3 cap(s) orally 2 times a day (05 Jul 2025 23:56)  midodrine 10 mg oral tablet: 1 tab(s) orally 3 times a day as needed for hypotension (06 Jul 2025 03:46)  potassium chloride 20 mEq oral tablet, extended release: 1 tab(s) orally (05 Jul 2025 23:56)  Probiotic Formula oral capsule: 1 cap(s) orally once a day (05 Jul 2025 23:52)  rosuvastatin 20 mg oral capsule: 1 cap(s) orally once a day (06 Jul 2025 03:47)  tamsulosin: 0.4 milligram(s) once a day (at bedtime) (06 Jul 2025 03:47)  torsemide 10 mg oral tablet: 1 tab(s) orally once a day (06 Jul 2025 03:47)    CURRENT CARDIAC MEDICATIONS:  diltiazem    Tablet 30 milliGRAM(s) Oral two times a day  diltiazem Injectable 5 milliGRAM(s) IV Push every 4 hours PRN sustained HR >120  midodrine. 10 milliGRAM(s) Oral three times a day PRN SBP <100    CURRENT OTHER MEDICATIONS:  acetaminophen     Tablet .. 650 milliGRAM(s) Oral every 6 hours PRN Temp greater or equal to 38C (100.4F), Mild Pain (1 - 3)  melatonin 3 milliGRAM(s) Oral at bedtime PRN Insomnia  ondansetron Injectable 4 milliGRAM(s) IV Push every 8 hours PRN Nausea and/or Vomiting  aluminum hydroxide/magnesium hydroxide/simethicone Suspension 30 milliLiter(s) Oral every 4 hours PRN Dyspepsia  mesalamine ER Capsule 1500 milliGRAM(s) Oral two times a day  rosuvastatin 20 milliGRAM(s) Oral at bedtime  tamsulosin 0.4 milliGRAM(s) Oral at bedtime    ALLERGIES:   penicillin (Unknown)  penicillins (Unknown)    REVIEW OF SYMPTOMS:   CONSTITUTIONAL: No fever, no chills, no weight loss, no weight gain, no fatigue   ENMT:  No vertigo; No sinus or throat pain  NECK: No pain or stiffness  CARDIOVASCULAR: No chest pain, no dyspnea, no syncope/presyncope, no palpitations, no dizziness, no Orthopnea, no Paroxsymal nocturnal dyspnea  RESPIRATORY: no Shortness of breath, no cough, no wheezing  : No dysuria, no hematuria   GI: No dark color stool, no nausea, no diarrhea, no constipation, +abdominal pain   NEURO: No headache, no slurred speech   MUSCULOSKELETAL: No joint pain or swelling; No muscle, back, or extremity pain  PSYCH: No agitation, no anxiety.    ALL OTHER REVIEW OF SYSTEMS ARE NEGATIVE.    VITAL SIGNS:  T(C): 37.1 (07-06-25 @ 13:47), Max: 37.3 (07-06-25 @ 13:36)  T(F): 98.7 (07-06-25 @ 13:47), Max: 99.1 (07-06-25 @ 13:36)  HR: 82 (07-06-25 @ 13:36) (63 - 104)  BP: 122/80 (07-06-25 @ 13:36) (99/65 - 122/80)  RR: 18 (07-06-25 @ 13:36) (18 - 20)  SpO2: 94% (07-06-25 @ 13:36) (89% - 98%)    INTAKE AND OUTPUT:    PHYSICAL EXAM:  Constitutional: Comfortable . No acute distress.   HEENT: Atraumatic and normocephalic , neck is supple . no JVD. No carotid bruit.  CNS: A&Ox3. No focal deficits.   Respiratory: CTAB, unlabored   Cardiovascular: RRR normal s1 s2. No murmur. No rubs or gallop.  Gastrointestinal: Soft, non-tender. +Bowel sounds.   Extremities: 2+ Peripheral Pulses, No clubbing, cyanosis, or edema  Psychiatric: Calm . no agitation.   Skin: Warm and dry, no ulcers on extremities     LABS:             10.1   11.82 )-----------( 559      ( 06 Jul 2025 03:44 )             33.2     07-06    138  |  105  |  8.0  ----------------------------<  174[H]  3.4[L]   |  22.0  |  0.73    Ca    8.6      06 Jul 2025 03:44  Mg     1.6     07-06  TPro  6.1[L]  /  Alb  2.8[L]  /  TBili  0.3[L]  /  DBili  x   /  AST  12  /  ALT  9   /  AlkPhos  50  07-06  PT/INR - ( 05 Jul 2025 13:57 )   PT: 13.0 sec;   INR: 1.12 ratio    PTT - ( 05 Jul 2025 13:57 )  PTT:34.1 sec    Urinalysis Basic - ( 06 Jul 2025 03:44 )  Color: x / Appearance: x / SG: x / pH: x  Gluc: 174 mg/dL / Ketone: x  / Bili: x / Urobili: x   Blood: x / Protein: x / Nitrite: x   Leuk Esterase: x / RBC: x / WBC x   Sq Epi: x / Non Sq Epi: x / Bacteria: x    INTERPRETATION OF TELEMETRY: NSR with  PAF     ECG:  NSR with PACs  Prior ECG: Yes [ x ] No [  ]    RADIOLOGY & ADDITIONAL STUDIES:    X-ray:  < from: Xray Chest 1 View- PORTABLE-Urgent (07.05.25 @ 14:16) >  Left pleural effusion/thickening.  No parenchymal opacities.    < end of copied text >    CT scan:   < from: CT Chest w/ IV Cont (07.05.25 @ 17:51) >  12 mm right upper lobe lung nodule, suspicious for malignancy. No pleural   effusion.    Findings suggestive chronic inflammatory bowel disease in the colon.   Cannot exclude mild acute colitis of the transverse colon.    Moderate size hiatal hernia containing gastric fundus and upper body. No   gastric outlet obstruction. No bowel obstruction.    < end of copied text >  MRI:   US:

## 2025-07-06 NOTE — CONSULT NOTE ADULT - ASSESSMENT
60 yo M with PMH UC (on mesalamine), CHF who presents for nausea, vomiting, PO intolerance, diarrhea, fatigue. On review of labs, I note mild leukocytosis,  elevated ESR, anemia, low abumin. C diff and GI PCR negative.  GI seen patient concern for IBD flare.     Question of adrenal insufficiency:  -Patient has been  off and on steroids for more than 20 years, now weaned off over 1 month. Not been on any steroids for 2 weeks.  -Morning cortisol was 4.4  - ACTH stim test showed baseline: 4.5, 30 min----> 9.2 ,60 min ---->11.3.  there was some response to stim but partial.  -Incase patient needs steroids for GI condition then go ahead with steroids per GI recs.  -Incase he does not need steroids from GI standpoint, or when ready to be off steroids for UC,   will recommend to start some physiologic doses of hydrocortisone ( 10 mg in am and 5 mg in pm) and little longer akla as outpatient , with close monitoring  to see if   his HPA axis further improves, as there was some partial response to stim test.       DM type 2: a1c 6.7%  -Continue admelog ssi.  -check fingerstick ac tid hs.  -diabetic diet.    Ulcerative Colitis  -management  per GI and primary team.  -c/w  mesalamine   - Trend ESR/ CRP daily

## 2025-07-06 NOTE — CONSULT NOTE ADULT - SUBJECTIVE AND OBJECTIVE BOX
Patient is a 59 year old male with a PMHx of Ulcerative Colitis on mesalamine, SALLY, atrial fibrillation s/p Watchman and ablation, CHF, BPH, lung nodules who presented to the ED on 7/5 afternoon with c/o 1 week of abdominal pain and inability to tolerate PO intake. Pt stated that he had an UC flare 2 weeks ago which was treated with prednisone. Patient stated that he may have had some blood with his last bowel movement prior to admission.  Admitted for colitis workup as well as adrenal insufficiency workup. Incidental finding of 12cm RUL nodule suspicious for malignancy. Thoracic surgery called to evaluate. Patient states that he has history of lung nodules that were surveilled in past by his pulmonologist, but he was told that they were stable and monitoring was stopped approx 1-2 years ago. Patient is a former smoker quit 25 years ago after smoking up to 3PPD for 25 years.    Subjective - patient seen and evaluated bedside. Sitting comfortably in bed. Denies CP, SOB, HA, dizziness, n/v/d    Review of Systems: negative x 10 systems except as noted above    Brief summary:  59yMale POD#     Significant/Kcoz63ep events:      PAST MEDICAL & SURGICAL HISTORY:  Ulcerative colitis      Chronic sinusitis      Afib  2015      Sleep apnea  not using c-pap      Lung nodules  watching      Renal calculus  current      [place] (hard of hearing), bilateral      DDD (degenerative disc disease), lumbar      Broken ribs  left posterior 9th and 10th      History of colonoscopy      Hernia  inguinal left      S/P ablation of atrial flutter      Presence of Watchman left atrial appendage closure device            acetaminophen     Tablet .. 650 milliGRAM(s) Oral every 6 hours PRN  aluminum hydroxide/magnesium hydroxide/simethicone Suspension 30 milliLiter(s) Oral every 4 hours PRN  diltiazem    Tablet 30 milliGRAM(s) Oral two times a day  diltiazem Injectable 5 milliGRAM(s) IV Push every 4 hours PRN  enoxaparin Injectable 40 milliGRAM(s) SubCutaneous every 24 hours  melatonin 3 milliGRAM(s) Oral at bedtime PRN  mesalamine ER Capsule 1500 milliGRAM(s) Oral two times a day  midodrine. 10 milliGRAM(s) Oral three times a day PRN  morphine  - Injectable 4 milliGRAM(s) IV Push once  ondansetron Injectable 4 milliGRAM(s) IV Push every 8 hours PRN  rosuvastatin 20 milliGRAM(s) Oral at bedtime  tamsulosin 0.4 milliGRAM(s) Oral at bedtime  MEDICATIONS  (PRN):  acetaminophen     Tablet .. 650 milliGRAM(s) Oral every 6 hours PRN Temp greater or equal to 38C (100.4F), Mild Pain (1 - 3)  aluminum hydroxide/magnesium hydroxide/simethicone Suspension 30 milliLiter(s) Oral every 4 hours PRN Dyspepsia  diltiazem Injectable 5 milliGRAM(s) IV Push every 4 hours PRN sustained HR >120  melatonin 3 milliGRAM(s) Oral at bedtime PRN Insomnia  midodrine. 10 milliGRAM(s) Oral three times a day PRN SBP <100  ondansetron Injectable 4 milliGRAM(s) IV Push every 8 hours PRN Nausea and/or Vomiting    Height (cm): 170.2 (07-05 @ 22:36)  Weight (kg): 98.5 (07-05 @ 22:36)  BMI (kg/m2): 34 (07-05 @ 22:36)  BSA (m2): 2.09 (07-05 @ 22:36)  Daily Height in cm: 170.18 (05 Jul 2025 22:36)    Daily                               10.1   11.82 )-----------( 559      ( 06 Jul 2025 03:44 )             33.2   07-06    138  |  105  |  8.0  ----------------------------<  174[H]  3.4[L]   |  22.0  |  0.73    Ca    8.6      06 Jul 2025 03:44  Mg     1.6     07-06    TPro  6.1[L]  /  Alb  2.8[L]  /  TBili  0.3[L]  /  DBili  x   /  AST  12  /  ALT  9   /  AlkPhos  50  07-06      PT/INR - ( 05 Jul 2025 13:57 )   PT: 13.0 sec;   INR: 1.12 ratio         PTT - ( 05 Jul 2025 13:57 )  PTT:34.1 sec      Objective:  T(C): 36.7 (07-06-25 @ 07:54), Max: 37.4 (07-05-25 @ 13:51)  HR: 90 (07-06-25 @ 08:00) (63 - 104)  BP: 101/67 (07-06-25 @ 07:54) (99/65 - 120/81)  RR: 18 (07-06-25 @ 07:54) (18 - 20)  SpO2: 92% (07-06-25 @ 07:54) (89% - 98%)  Wt(kg): --CAPILLARY BLOOD GLUCOSE      I&O's Summary      Physical Exam  General: NAD  Neuro: A+O x 3, non-focal, speech clear and intact  Psych: Appropriate affect  Pulm: CTA, equal bilaterally  CV: RRR,+S1S2  Abd: soft, NT, ND,   Ext: +DP Pulses b/l, no edema  Skin: Warm, dry, intact          Imaging:        < from: CT Chest w/ IV Cont (07.05.25 @ 17:51) >    IMPRESSION:    12 mm right upper lobe lung nodule, suspicious for malignancy. No pleural   effusion.    Findings suggestive chronic inflammatory bowel disease in the colon.   Cannot exclude mild acute colitis of the transverse colon.    Moderate size hiatal hernia containing gastric fundus and upper body. No   gastric outlet obstruction. No bowel obstruction.    --- End of Report ---      < end of copied text >

## 2025-07-06 NOTE — CONSULT NOTE ADULT - PROBLEM SELECTOR RECOMMENDATION 9
Cardiac Risk Stratification for Procedure  - METS >4  - RCRI Risk Stratification: Class I Risk 1.1%   Risk of Major Cardiac Event  - RCRI: Estimated Risk of Adverse Outcome with Non-cardiac Surgery: Low risk   - Estimated Rate of Myocardial Infarction, Pulmonary Edema, Ventricular Fibrillation, Cardiac Arrest, or Complete Heart Block is 0.9%     No active chest pain, evidence of ischemia, decompensated CHF, significant valvular abnormality, or unstable arrhythmia then therefore no absolute cardiac contraindication to the planned surgical procedure.  No further cardiac work up is needed.     Further cardiac work up will not change risk of the patient. Proceed for surgery as indicated.   - Thoracic surgery may proceed with surgery and procedures with the understanding of the above risk profile. No absolute cardiac contraindication to the procedure/surgery.
New 12mm spiculated RUL nodule suspicious for malignancy  Plan for ION bronchoscopy on this admission for diagnosis  Will need medical clearance (pending adrenal insufficiency workup)   Please obtain cardiology clearance.   Date/time of procedure pending above clearances.   D/w Dr Vaughan

## 2025-07-06 NOTE — CONSULT NOTE ADULT - ASSESSMENT
58 yo M with PMH UC (on mesalamine), CHF who presents for nausea, vomiting, PO intolerance, diarrhea, fatigue. Patient seen by endocrinology and underwent ACTH stim test to assess for adrenal insufficiency. On review of labs, I note mild leukocytosis,  elevated ESR, anemia, low abumin. C diff and GI PCR negative. CT personally reviewed and I agree with the radiologists interpretation. In regards to diarrhea, at this time, most concerned for IBD flare. If endocrine workup is nonconcerning and there are no plans for hydrocort then I would start prednisone 40 MG or IV steroids for management of IBD. Vomiting is new for patient in setting of an IBD flare and so would assess for stricture with CT enterography (there are causes of patients diagnosed with ulcerative colitis who are later found to have Crohns disease).      #Ulcerative Colitis  - await endocrinology recommendations.  - if no plans for steroids from endocrine then start IV solumedrol 60 MG QD  - mesalamine 4.8 G/day  - f/u fecal calprotectin  - Trend ESR/ CRP daily  - Monitor stool counts  - CT Enterography  - please get QuantiFeron gold and HBV surface Antibody and antigen and core antibody in preparation for possible biologic therapy

## 2025-07-06 NOTE — CONSULT NOTE ADULT - NS ATTEND AMEND GEN_ALL_CORE FT
seen with above,    59M history significant for obesity, SALLY, HFpEF, Afib s/p repeated ablations x2 and Watchman 2024, left heart cath without CAD in 2024, ulcerative colitis presents with abdominal pain and intermittent vomiting, CT imaging found with incidental 12 cm RUL lung mass suspicious for malignancy now planning for bronchoscopic biopsy    -no cardiac contraindication for the thoracic procedure, currently optimized   -has not been taking ASA daily consistently at home  -pAfib RVR on tele on diltiazem 30mg BID can further uptitrate dose/frequency as BP tolerate then to long acting version, also will change IV diltiazem PRN to metoprolol with less hypotensive effect   -unclear reason for the midodrine use   -home med with low dose Torsemide 10mg      Dimitri Manzo DO, [facility]  Faculty Non-Invasive Cardiologist  [phone] seen with above,    59M history significant for obesity, SALLY, HFpEF, Afib s/p repeated ablations x2 and Watchman 2024, left heart cath without CAD in 2024, ulcerative colitis presents with abdominal pain and intermittent vomiting, CT imaging found with incidental 12 cm RUL lung mass suspicious for malignancy now planning for bronchoscopic biopsy    -no cardiac contraindication for the thoracic procedure, currently optimized   -has not been taking ASA daily consistently at home  -pAfib RVR on tele on diltiazem 30mg BID can further uptitrate dose/frequency as BP tolerate then to long acting version, also will change IV diltiazem PRN to metoprolol with less hypotensive effect   -unclear reason for the midodrine use   -home med with low dose Torsemide 10mg  -reconsult if new cardiac issue arise       Dimitri Manzo DO, [facility]  Faculty Non-Invasive Cardiologist  [phone]

## 2025-07-06 NOTE — CONSULT NOTE ADULT - REASON FOR ADMISSION
Abdominal pain, hypotension

## 2025-07-06 NOTE — CONSULT NOTE ADULT - SUBJECTIVE AND OBJECTIVE BOX
REASON FOR CONSULTATION:     HPI:  Patient is a 59 year old male with history of UC on mesalamine, SALLY, afib s/p ablation, CHF of unknown type presenting with 1 week of abdominal pain/inability to tolerate PO. Patient has also been feeling very weak during this time. Patient had flare of UC around 2 weeks ago and was treated with prednisone Patient completed self-taper which he has done in the past. Patient now experiencing abdominal pain and having soft stool. Patient states he has also been feeling some shortness of breath recently. Patient indicating abdominal pain in left lower quadrant. Patient also states pain has been in right flank as well. Patient states with last bowel movement may have had some blood. Of note patient on midodrine at home states his blood pressure does sometimes run low. Patient states to have CHF but does not know what type given that he is on midodrine likely HFrEF. Patient has also been vomiting. Denies Chest pain,  fevers, chills, nausea, diarrhea, dysuria, headache, dizziness. Patient found to be hypotensive in the ED with concern for adrenal insufficiency. Patient CT showing pulmonary nodule and transverse colitis. Endocrinology consulted and recommended to do a ATCH stimulation test.     Per nurse patient has been in A fib occasionally and will go to 140s but then return to baseline  bpm.  (05 Jul 2025 23:32)      REVIEW OF SYSTEMS:  Constitutional, Eyes, ENT, Cardiovascular, Respiratory, Gastrointestinal, Genitourinary, Musculoskeletal, Integumentary, Neurological, Psychiatric, Endocrine, Heme/Lymph, and Allergic/Immunologic review of systems are otherwise negative except as noted in the HPI.    PAST MEDICAL & SURGICAL HISTORY:  Ulcerative colitis      Chronic sinusitis      Afib  2015      Sleep apnea  not using c-pap      Lung nodules  watching      Renal calculus  current      [place] (hard of hearing), bilateral      DDD (degenerative disc disease), lumbar      Broken ribs  left posterior 9th and 10th      History of colonoscopy      Hernia  inguinal left      S/P ablation of atrial flutter      Presence of Watchman left atrial appendage closure device          FAMILY HISTORY:  No pertinent family history in first degree relatives        SOCIAL HISTORY:    Allergies    penicillin (Unknown)  penicillins (Unknown)    Intolerances        MEDICATIONS  (STANDING):  diltiazem    Tablet 30 milliGRAM(s) Oral two times a day  mesalamine ER Capsule 1500 milliGRAM(s) Oral two times a day  rosuvastatin 20 milliGRAM(s) Oral at bedtime  tamsulosin 0.4 milliGRAM(s) Oral at bedtime    MEDICATIONS  (PRN):  acetaminophen     Tablet .. 650 milliGRAM(s) Oral every 6 hours PRN Temp greater or equal to 38C (100.4F), Mild Pain (1 - 3)  aluminum hydroxide/magnesium hydroxide/simethicone Suspension 30 milliLiter(s) Oral every 4 hours PRN Dyspepsia  diltiazem Injectable 5 milliGRAM(s) IV Push every 4 hours PRN sustained HR >120  melatonin 3 milliGRAM(s) Oral at bedtime PRN Insomnia  midodrine. 10 milliGRAM(s) Oral three times a day PRN SBP <100  ondansetron Injectable 4 milliGRAM(s) IV Push every 8 hours PRN Nausea and/or Vomiting      Vital Signs Last 24 Hrs  T(C): 37.1 (06 Jul 2025 13:47), Max: 37.3 (06 Jul 2025 13:36)  T(F): 98.7 (06 Jul 2025 13:47), Max: 99.1 (06 Jul 2025 13:36)  HR: 82 (06 Jul 2025 13:36) (81 - 104)  BP: 122/80 (06 Jul 2025 13:36) (101/67 - 122/80)  BP(mean): --  RR: 18 (06 Jul 2025 13:36) (18 - 20)  SpO2: 94% (06 Jul 2025 13:36) (89% - 98%)    Parameters below as of 06 Jul 2025 13:36  Patient On (Oxygen Delivery Method): room air        PHYSICAL EXAM:    GENERAL: NAD, well-groomed, well-developed  HEAD:  Atraumatic, Normocephalic  EYES: EOMI, PERRLA, conjunctiva and sclera clear  ENMT: No tonsillar erythema, exudates, or enlargement; Moist mucous membranes, Good dentition, No lesions  NECK: Supple, No JVD, Normal thyroid  NERVOUS SYSTEM:  Alert & Oriented X3, Good concentration; Motor Strength 5/5 B/L upper and lower extremities; DTRs 2+ intact and symmetric  CHEST/LUNG: Clear to auscultation bilaterally; No rales, rhonchi, wheezing, or rubs  HEART: Regular rate and rhythm; No murmurs, rubs, or gallops  ABDOMEN: Soft, Nontender, Nondistended; Bowel sounds present  EXTREMITIES:  2+ Peripheral Pulses, No clubbing, cyanosis, or edema  LYMPH: No lymphadenopathy noted  SKIN: No rashes or lesions      LABS:                        10.1   11.82 )-----------( 559      ( 06 Jul 2025 03:44 )             33.2     07-06    138  |  105  |  8.0  ----------------------------<  174[H]  3.4[L]   |  22.0  |  0.73    Ca    8.6      06 Jul 2025 03:44  Mg     1.6     07-06    TPro  6.1[L]  /  Alb  2.8[L]  /  TBili  0.3[L]  /  DBili  x   /  AST  12  /  ALT  9   /  AlkPhos  50  07-06    PT/INR - ( 05 Jul 2025 13:57 )   PT: 13.0 sec;   INR: 1.12 ratio         PTT - ( 05 Jul 2025 13:57 )  PTT:34.1 sec  Urinalysis Basic - ( 06 Jul 2025 03:44 )    Color: x / Appearance: x / SG: x / pH: x  Gluc: 174 mg/dL / Ketone: x  / Bili: x / Urobili: x   Blood: x / Protein: x / Nitrite: x   Leuk Esterase: x / RBC: x / WBC x   Sq Epi: x / Non Sq Epi: x / Bacteria: x          RADIOLOGY & ADDITIONAL STUDIES:  < from: CT Chest w/ IV Cont (07.05.25 @ 17:51) >  IMPRESSION:    12 mm right upper lobe lung nodule, suspicious for malignancy. No pleural   effusion.    Findings suggestive chronic inflammatory bowel disease in the colon.   Cannot exclude mild acute colitis of the transverse colon.    Moderate size hiatal hernia containing gastric fundus and upper body. No   gastric outlet obstruction. No bowel obstruction.    --- End of Report ---      < end of copied text >     REASON FOR CONSULTATION:     HPI:  59 year old male with history of UC on mesalamine, SALLY, afib s/p ablation, CHF of unknown type presenting with 1 week of abdominal pain/inability to tolerate PO. Patient has also been feeling very weak during this time. Patient had flare of UC around 2 weeks ago and was treated with prednisone Patient completed self-taper which he has done in the past. Patient now experiencing abdominal pain and having soft stool. Patient states he has also been feeling some shortness of breath recently. Patient indicating abdominal pain in left lower quadrant. Patient also states pain has been in right flank as well. Patient states with last bowel movement may have had some blood. Of note patient on midodrine at home states his blood pressure does sometimes run low. Patient states to have CHF but does not know what type given that he is on midodrine likely HFrEF. Patient has also been vomiting. Denies Chest pain,  fevers, chills, nausea, diarrhea, dysuria, headache, dizziness. Patient found to be hypotensive in the ED with concern for adrenal insufficiency. Patient CT showing pulmonary nodule and transverse colitis. Endocrinology consulted and recommended to do a ATCH stimulation test.     Per nurse patient has been in A fib occasionally and will go to 140s but then return to baseline  bpm.  (05 Jul 2025 23:32)      REVIEW OF SYSTEMS:  Constitutional, Eyes, ENT, Cardiovascular, Respiratory, Gastrointestinal, Genitourinary, Musculoskeletal, Integumentary, Neurological, Psychiatric, Endocrine, Heme/Lymph, and Allergic/Immunologic review of systems are otherwise negative except as noted in the HPI.    PAST MEDICAL & SURGICAL HISTORY:  Ulcerative colitis      Chronic sinusitis      Afib  2015      Sleep apnea  not using c-pap      Lung nodules  watching      Renal calculus  current      [place] (hard of hearing), bilateral      DDD (degenerative disc disease), lumbar      Broken ribs  left posterior 9th and 10th      History of colonoscopy      Hernia  inguinal left      S/P ablation of atrial flutter      Presence of Watchman left atrial appendage closure device          FAMILY HISTORY:  No pertinent family history in first degree relatives        SOCIAL HISTORY:    Allergies    penicillin (Unknown)  penicillins (Unknown)    Intolerances        MEDICATIONS  (STANDING):  diltiazem    Tablet 30 milliGRAM(s) Oral two times a day  mesalamine ER Capsule 1500 milliGRAM(s) Oral two times a day  rosuvastatin 20 milliGRAM(s) Oral at bedtime  tamsulosin 0.4 milliGRAM(s) Oral at bedtime    MEDICATIONS  (PRN):  acetaminophen     Tablet .. 650 milliGRAM(s) Oral every 6 hours PRN Temp greater or equal to 38C (100.4F), Mild Pain (1 - 3)  aluminum hydroxide/magnesium hydroxide/simethicone Suspension 30 milliLiter(s) Oral every 4 hours PRN Dyspepsia  diltiazem Injectable 5 milliGRAM(s) IV Push every 4 hours PRN sustained HR >120  melatonin 3 milliGRAM(s) Oral at bedtime PRN Insomnia  midodrine. 10 milliGRAM(s) Oral three times a day PRN SBP <100  ondansetron Injectable 4 milliGRAM(s) IV Push every 8 hours PRN Nausea and/or Vomiting      Vital Signs Last 24 Hrs  T(C): 37.1 (06 Jul 2025 13:47), Max: 37.3 (06 Jul 2025 13:36)  T(F): 98.7 (06 Jul 2025 13:47), Max: 99.1 (06 Jul 2025 13:36)  HR: 82 (06 Jul 2025 13:36) (81 - 104)  BP: 122/80 (06 Jul 2025 13:36) (101/67 - 122/80)  BP(mean): --  RR: 18 (06 Jul 2025 13:36) (18 - 20)  SpO2: 94% (06 Jul 2025 13:36) (89% - 98%)    Parameters below as of 06 Jul 2025 13:36  Patient On (Oxygen Delivery Method): room air        PHYSICAL EXAM:    GENERAL: NAD, well-groomed, well-developed  HEAD:  Atraumatic, Normocephalic  EYES: EOMI, PERRLA, conjunctiva and sclera clear  ENMT: No tonsillar erythema, exudates, or enlargement; Moist mucous membranes, Good dentition, No lesions  NECK: Supple, No JVD, Normal thyroid  NERVOUS SYSTEM:  Alert & Oriented X3, Good concentration; Motor Strength 5/5 B/L upper and lower extremities; DTRs 2+ intact and symmetric  CHEST/LUNG: Clear to auscultation bilaterally; No rales, rhonchi, wheezing, or rubs  HEART: Regular rate and rhythm; No murmurs, rubs, or gallops  ABDOMEN: Soft, Nontender, Nondistended; Bowel sounds present  EXTREMITIES:  2+ Peripheral Pulses, No clubbing, cyanosis, or edema  LYMPH: No lymphadenopathy noted  SKIN: No rashes or lesions      LABS:                        10.1   11.82 )-----------( 559      ( 06 Jul 2025 03:44 )             33.2     07-06    138  |  105  |  8.0  ----------------------------<  174[H]  3.4[L]   |  22.0  |  0.73    Ca    8.6      06 Jul 2025 03:44  Mg     1.6     07-06    TPro  6.1[L]  /  Alb  2.8[L]  /  TBili  0.3[L]  /  DBili  x   /  AST  12  /  ALT  9   /  AlkPhos  50  07-06    PT/INR - ( 05 Jul 2025 13:57 )   PT: 13.0 sec;   INR: 1.12 ratio         PTT - ( 05 Jul 2025 13:57 )  PTT:34.1 sec  Urinalysis Basic - ( 06 Jul 2025 03:44 )    Color: x / Appearance: x / SG: x / pH: x  Gluc: 174 mg/dL / Ketone: x  / Bili: x / Urobili: x   Blood: x / Protein: x / Nitrite: x   Leuk Esterase: x / RBC: x / WBC x   Sq Epi: x / Non Sq Epi: x / Bacteria: x          RADIOLOGY & ADDITIONAL STUDIES:  < from: CT Chest w/ IV Cont (07.05.25 @ 17:51) >  IMPRESSION:    12 mm right upper lobe lung nodule, suspicious for malignancy. No pleural   effusion.    Findings suggestive chronic inflammatory bowel disease in the colon.   Cannot exclude mild acute colitis of the transverse colon.    Moderate size hiatal hernia containing gastric fundus and upper body. No   gastric outlet obstruction. No bowel obstruction.    --- End of Report ---      < end of copied text >

## 2025-07-06 NOTE — PROGRESS NOTE ADULT - ATTENDING COMMENTS
Acute colitis - possible UC flair vs infectious  R/o Adrenal Insufficiency as patient recently taken off steroids  Lung nodule    follow up gi pcr  gi consult  endocrine follow up   thoracic consult for lung nodule. possible inpatient biopsy

## 2025-07-07 ENCOUNTER — TRANSCRIPTION ENCOUNTER (OUTPATIENT)
Age: 60
End: 2025-07-07

## 2025-07-07 VITALS
HEART RATE: 92 BPM | SYSTOLIC BLOOD PRESSURE: 132 MMHG | TEMPERATURE: 97 F | DIASTOLIC BLOOD PRESSURE: 71 MMHG | OXYGEN SATURATION: 94 % | RESPIRATION RATE: 18 BRPM

## 2025-07-07 LAB
ALBUMIN SERPL ELPH-MCNC: 3.2 G/DL — LOW (ref 3.3–5.2)
ALP SERPL-CCNC: 61 U/L — SIGNIFICANT CHANGE UP (ref 40–120)
ALT FLD-CCNC: 10 U/L — SIGNIFICANT CHANGE UP
ANION GAP SERPL CALC-SCNC: 12 MMOL/L — SIGNIFICANT CHANGE UP (ref 5–17)
AST SERPL-CCNC: 17 U/L — SIGNIFICANT CHANGE UP
BASOPHILS # BLD AUTO: 0.04 K/UL — SIGNIFICANT CHANGE UP (ref 0–0.2)
BASOPHILS NFR BLD AUTO: 0.4 % — SIGNIFICANT CHANGE UP (ref 0–2)
BILIRUB SERPL-MCNC: 0.2 MG/DL — LOW (ref 0.4–2)
BUN SERPL-MCNC: 11.3 MG/DL — SIGNIFICANT CHANGE UP (ref 8–20)
CALCIUM SERPL-MCNC: 8.9 MG/DL — SIGNIFICANT CHANGE UP (ref 8.4–10.5)
CHLORIDE SERPL-SCNC: 106 MMOL/L — SIGNIFICANT CHANGE UP (ref 96–108)
CO2 SERPL-SCNC: 20 MMOL/L — LOW (ref 22–29)
CREAT SERPL-MCNC: 0.98 MG/DL — SIGNIFICANT CHANGE UP (ref 0.5–1.3)
CRP SERPL-MCNC: 8 MG/L — HIGH
EGFR: 88 ML/MIN/1.73M2 — SIGNIFICANT CHANGE UP
EGFR: 88 ML/MIN/1.73M2 — SIGNIFICANT CHANGE UP
EOSINOPHIL # BLD AUTO: 0.02 K/UL — SIGNIFICANT CHANGE UP (ref 0–0.5)
EOSINOPHIL NFR BLD AUTO: 0.2 % — SIGNIFICANT CHANGE UP (ref 0–6)
ERYTHROCYTE [SEDIMENTATION RATE] IN BLOOD: 53 MM/HR — HIGH (ref 0–15)
GLUCOSE BLDC GLUCOMTR-MCNC: 154 MG/DL — HIGH (ref 70–99)
GLUCOSE BLDC GLUCOMTR-MCNC: 298 MG/DL — HIGH (ref 70–99)
GLUCOSE SERPL-MCNC: 180 MG/DL — HIGH (ref 70–99)
HBV CORE AB SER-ACNC: SIGNIFICANT CHANGE UP
HBV SURFACE AB SER-ACNC: ABNORMAL
HBV SURFACE AG SER-ACNC: SIGNIFICANT CHANGE UP
HCT VFR BLD CALC: 34.1 % — LOW (ref 39–50)
HGB BLD-MCNC: 10.2 G/DL — LOW (ref 13–17)
IMM GRANULOCYTES # BLD AUTO: 0.12 K/UL — HIGH (ref 0–0.07)
IMM GRANULOCYTES NFR BLD AUTO: 1.2 % — HIGH (ref 0–0.9)
LYMPHOCYTES # BLD AUTO: 1.31 K/UL — SIGNIFICANT CHANGE UP (ref 1–3.3)
LYMPHOCYTES NFR BLD AUTO: 13.5 % — SIGNIFICANT CHANGE UP (ref 13–44)
MAGNESIUM SERPL-MCNC: 2.2 MG/DL — SIGNIFICANT CHANGE UP (ref 1.6–2.6)
MCHC RBC-ENTMCNC: 25.4 PG — LOW (ref 27–34)
MCHC RBC-ENTMCNC: 29.9 G/DL — LOW (ref 32–36)
MCV RBC AUTO: 84.8 FL — SIGNIFICANT CHANGE UP (ref 80–100)
MONOCYTES # BLD AUTO: 0.29 K/UL — SIGNIFICANT CHANGE UP (ref 0–0.9)
MONOCYTES NFR BLD AUTO: 3 % — SIGNIFICANT CHANGE UP (ref 2–14)
NEUTROPHILS # BLD AUTO: 7.9 K/UL — HIGH (ref 1.8–7.4)
NEUTROPHILS NFR BLD AUTO: 81.7 % — HIGH (ref 43–77)
NRBC # BLD AUTO: 0 K/UL — SIGNIFICANT CHANGE UP (ref 0–0)
NRBC # FLD: 0 K/UL — SIGNIFICANT CHANGE UP (ref 0–0)
NRBC BLD AUTO-RTO: 0 /100 WBCS — SIGNIFICANT CHANGE UP (ref 0–0)
PLATELET # BLD AUTO: 606 K/UL — HIGH (ref 150–400)
PMV BLD: 9.4 FL — SIGNIFICANT CHANGE UP (ref 7–13)
POTASSIUM SERPL-MCNC: 4.9 MMOL/L — SIGNIFICANT CHANGE UP (ref 3.5–5.3)
POTASSIUM SERPL-SCNC: 4.9 MMOL/L — SIGNIFICANT CHANGE UP (ref 3.5–5.3)
PROT SERPL-MCNC: 7.1 G/DL — SIGNIFICANT CHANGE UP (ref 6.6–8.7)
RBC # BLD: 4.02 M/UL — LOW (ref 4.2–5.8)
RBC # FLD: 16 % — HIGH (ref 10.3–14.5)
SODIUM SERPL-SCNC: 138 MMOL/L — SIGNIFICANT CHANGE UP (ref 135–145)
WBC # BLD: 9.68 K/UL — SIGNIFICANT CHANGE UP (ref 3.8–10.5)
WBC # FLD AUTO: 9.68 K/UL — SIGNIFICANT CHANGE UP (ref 3.8–10.5)

## 2025-07-07 PROCEDURE — 85027 COMPLETE CBC AUTOMATED: CPT

## 2025-07-07 PROCEDURE — 85730 THROMBOPLASTIN TIME PARTIAL: CPT

## 2025-07-07 PROCEDURE — 86900 BLOOD TYPING SEROLOGIC ABO: CPT

## 2025-07-07 PROCEDURE — 96375 TX/PRO/DX INJ NEW DRUG ADDON: CPT

## 2025-07-07 PROCEDURE — 86480 TB TEST CELL IMMUN MEASURE: CPT

## 2025-07-07 PROCEDURE — 99233 SBSQ HOSP IP/OBS HIGH 50: CPT

## 2025-07-07 PROCEDURE — 94660 CPAP INITIATION&MGMT: CPT

## 2025-07-07 PROCEDURE — 82533 TOTAL CORTISOL: CPT

## 2025-07-07 PROCEDURE — 87637 SARSCOV2&INF A&B&RSV AMP PRB: CPT

## 2025-07-07 PROCEDURE — 74177 CT ABD & PELVIS W/CONTRAST: CPT

## 2025-07-07 PROCEDURE — 87507 IADNA-DNA/RNA PROBE TQ 12-25: CPT

## 2025-07-07 PROCEDURE — 83735 ASSAY OF MAGNESIUM: CPT

## 2025-07-07 PROCEDURE — 99239 HOSP IP/OBS DSCHRG MGMT >30: CPT

## 2025-07-07 PROCEDURE — 87340 HEPATITIS B SURFACE AG IA: CPT

## 2025-07-07 PROCEDURE — 82024 ASSAY OF ACTH: CPT

## 2025-07-07 PROCEDURE — 36415 COLL VENOUS BLD VENIPUNCTURE: CPT

## 2025-07-07 PROCEDURE — 85610 PROTHROMBIN TIME: CPT

## 2025-07-07 PROCEDURE — 83690 ASSAY OF LIPASE: CPT

## 2025-07-07 PROCEDURE — 96374 THER/PROPH/DIAG INJ IV PUSH: CPT

## 2025-07-07 PROCEDURE — 99231 SBSQ HOSP IP/OBS SF/LOW 25: CPT

## 2025-07-07 PROCEDURE — 85025 COMPLETE CBC W/AUTO DIFF WBC: CPT

## 2025-07-07 PROCEDURE — 85652 RBC SED RATE AUTOMATED: CPT

## 2025-07-07 PROCEDURE — 83036 HEMOGLOBIN GLYCOSYLATED A1C: CPT

## 2025-07-07 PROCEDURE — 93005 ELECTROCARDIOGRAM TRACING: CPT

## 2025-07-07 PROCEDURE — 99285 EMERGENCY DEPT VISIT HI MDM: CPT | Mod: 25

## 2025-07-07 PROCEDURE — 81001 URINALYSIS AUTO W/SCOPE: CPT

## 2025-07-07 PROCEDURE — 86706 HEP B SURFACE ANTIBODY: CPT

## 2025-07-07 PROCEDURE — 0241U: CPT

## 2025-07-07 PROCEDURE — 71045 X-RAY EXAM CHEST 1 VIEW: CPT

## 2025-07-07 PROCEDURE — 71260 CT THORAX DX C+: CPT

## 2025-07-07 PROCEDURE — 99232 SBSQ HOSP IP/OBS MODERATE 35: CPT

## 2025-07-07 PROCEDURE — 87493 C DIFF AMPLIFIED PROBE: CPT

## 2025-07-07 PROCEDURE — 80053 COMPREHEN METABOLIC PANEL: CPT

## 2025-07-07 PROCEDURE — 82962 GLUCOSE BLOOD TEST: CPT

## 2025-07-07 PROCEDURE — 83993 ASSAY FOR CALPROTECTIN FECAL: CPT

## 2025-07-07 PROCEDURE — 86850 RBC ANTIBODY SCREEN: CPT

## 2025-07-07 PROCEDURE — 86901 BLOOD TYPING SEROLOGIC RH(D): CPT

## 2025-07-07 PROCEDURE — 86140 C-REACTIVE PROTEIN: CPT

## 2025-07-07 PROCEDURE — 86704 HEP B CORE ANTIBODY TOTAL: CPT

## 2025-07-07 RX ORDER — MIDODRINE HYDROCHLORIDE 5 MG/1
1 TABLET ORAL
Refills: 0 | DISCHARGE

## 2025-07-07 RX ORDER — ECONAZOLE NITRATE 1 %
1 CREAM (GRAM) TOPICAL
Refills: 0 | DISCHARGE

## 2025-07-07 RX ORDER — PREDNISONE 20 MG/1
2 TABLET ORAL
Qty: 20 | Refills: 0
Start: 2025-07-07 | End: 2025-07-16

## 2025-07-07 RX ORDER — TORSEMIDE 10 MG
1 TABLET ORAL
Refills: 0 | DISCHARGE

## 2025-07-07 RX ADMIN — METHYLPREDNISOLONE ACETATE 60 MILLIGRAM(S): 80 INJECTION, SUSPENSION INTRA-ARTICULAR; INTRALESIONAL; INTRAMUSCULAR; SOFT TISSUE at 06:26

## 2025-07-07 RX ADMIN — Medication 650 MILLIGRAM(S): at 09:29

## 2025-07-07 RX ADMIN — INSULIN LISPRO 1: 100 INJECTION, SOLUTION INTRAVENOUS; SUBCUTANEOUS at 08:30

## 2025-07-07 RX ADMIN — Medication 1500 MILLIGRAM(S): at 06:25

## 2025-07-07 RX ADMIN — INSULIN LISPRO 3: 100 INJECTION, SOLUTION INTRAVENOUS; SUBCUTANEOUS at 12:04

## 2025-07-07 RX ADMIN — DILTIAZEM HYDROCHLORIDE 30 MILLIGRAM(S): 240 TABLET, EXTENDED RELEASE ORAL at 06:27

## 2025-07-07 RX ADMIN — Medication 650 MILLIGRAM(S): at 08:29

## 2025-07-07 NOTE — PROGRESS NOTE ADULT - REASON FOR ADMISSION
Abdominal pain, hypotension

## 2025-07-07 NOTE — DISCHARGE NOTE PROVIDER - PROVIDER TOKENS
FREE:[LAST:[PCP],PHONE:[(   )    -],FAX:[(   )    -]],FREE:[LAST:[GI],PHONE:[(   )    -],FAX:[(   )    -]],FREE:[LAST:[Thoracic surgery],PHONE:[(   )    -],FAX:[(   )    -]] PROVIDER:[TOKEN:[[id number]],FOLLOWUP:[1 week]],PROVIDER:[TOKEN:[[id number]],FOLLOWUP:[1 week]],PROVIDER:[TOKEN:[[id number]],FOLLOWUP:[1 week]]

## 2025-07-07 NOTE — DISCHARGE NOTE PROVIDER - NSDCFUADDAPPT_GEN_ALL_CORE_FT
APPTS ARE READY TO BE MADE: [X] YES    Best Family or Patient Contact (if needed):    Additional Information about above appointments (if needed):    1: Follow up with PCP in 1 week  2: Follow up with GI in 1 week  3: Follow up with thoracic surgery in 1 week    Other comments or requests:   APPTS ARE READY TO BE MADE: [X] YES    Best Family or Patient Contact (if needed):    Additional Information about above appointments (if needed):    1: Follow up with PCP (NAKIA JEAN BAPTISTE) in 1 week  2: Follow up with GI (Will Elias) in 1 week  3: Follow up with thoracic surgery (Campbell Vaughan) in 1 week    Other comments or requests:   APPTS ARE READY TO BE MADE: [X] YES    Best Family or Patient Contact (if needed):    Additional Information about above appointments (if needed):    1: Follow up with PCP (NAKIA JEAN BAPTISTE) in 1 week  2: Follow up with GI (Will Elias) in 1 week  3: Follow up with thoracic surgery (Campbell Vaughan) in 1 week    Other comments or requests:  Prior to outreaching the patient, it was visible that the patient has secured a follow up appointment which was not scheduled by our team. Patient was scheduled on 7/11 at 2:30PM at [address] [place] [address] with Dr. Vaughan       Patient informed us they already have secured a follow up appointment which is not visible on [facility] and declined to provide appointment details. Patient advised they already secured a follow up with their PCP/GI/Dr. Macedo but did not provide further information.

## 2025-07-07 NOTE — PROGRESS NOTE ADULT - PROBLEM SELECTOR PLAN 1
New 12mm spiculated RUL nodule suspicious for malignancy  Plan for ION bronchoscopy for diagnosis  Will need medical clearance - pending  Cardiology clearance obtained  To be scheduled tentatively for wednesday 7/9  Would prefer to do procedure while inpatient however patient expressed interest in elective outpatient procedure due to forthcoming important life events.   No objection to discharge from thoracic surgery perspective if patient is stable for discharge. In that event patient will need to follow up with Dr. Vaughan in Thoracic surgery office for further evaluation/scheduling.   Otherwise, will plan for 7/9  D/w Dr Vaughan.

## 2025-07-07 NOTE — DISCHARGE NOTE NURSING/CASE MANAGEMENT/SOCIAL WORK - NSDCFUADDAPPT_GEN_ALL_CORE_FT
APPTS ARE READY TO BE MADE: [X] YES    Best Family or Patient Contact (if needed):    Additional Information about above appointments (if needed):    1: Follow up with PCP in 1 week  2: Follow up with GI in 1 week  3: Follow up with thoracic surgery in 1 week    Other comments or requests:

## 2025-07-07 NOTE — PROGRESS NOTE ADULT - ASSESSMENT
59M with PMH ulcerative colotis, CHF who presents for nausea, vomiting, PO intolerance, diarrhea, fatigue. Endocrine consulted for question of adrenal insufficiency. Patient has been on and off steroids for more than 20 years, now weaned off over 1 month. Not been on any steroids for 2 weeks.    1. Rule out adrenal insufficiency  - AM cortisol 4.4  - ACTH stim test showed baseline: 4.5, 30 min --> 9.2 ,60 min -->11.3. There was some response to stim but partial  - Started on IV solumedrol 60mg from GI standpoint, with plan to change to Prednisone 40mg daily  - Will plan for a little longer taper as outpatient , with close monitoring to see if his HPA axis further improves, as there was some partial response to stim test  - Follow up appt: August 6th @ 11am ([address])     2. New onset diabetes, a1c 6.7%  - Continue correction scale  - Glucoses may be elevated 2/2 steroids  - Discharge on metformin 500mg daily. Needs diabetes education    3. Ulcerative Colitis  - GI following  - On steroids 59M with PMH ulcerative colotis, CHF who presents for nausea, vomiting, PO intolerance, diarrhea, fatigue. Endocrine consulted for question of adrenal insufficiency. Patient has been on and off steroids for more than 20 years, now weaned off over 1 month. Not been on any steroids for 2 weeks.    1. Rule out adrenal insufficiency  - AM cortisol 4.4  - ACTH stim test showed baseline: 4.5, 30 min --> 9.2 ,60 min -->11.3. There was some response to stim but partial  - Started on IV solumedrol 60mg from GI standpoint, with plan to change to Prednisone 40mg daily  - Will plan for a little longer taper as outpatient , with close monitoring to see if his HPA axis further improves, as there was some partial response to stim test  - Follow up appt: August 6th @ 11am ([address])     2. New onset diabetes, a1c 6.7%  - Continue correction scale  - Glucoses may be elevated 2/2 steroids  - Needs diabetes education and dietician consult     3. Ulcerative Colitis  - GI following  - On steroids

## 2025-07-07 NOTE — DISCHARGE NOTE PROVIDER - DETAILS OF MALNUTRITION DIAGNOSIS/DIAGNOSES
This patient has been assessed with a concern for Malnutrition and was treated during this hospitalization for the following Nutrition diagnosis/diagnoses:     -  07/07/2025: Severe protein-calorie malnutrition

## 2025-07-07 NOTE — DIETITIAN NUTRITION RISK NOTIFICATION - FINDINGS BASED ON COMPREHENSIVE NUTRITION ASSESSMENT, CONSULTATION PERFORMED ON
"Chief Complaint   Patient presents with     Consult     New patient consult, top surgery.        Vitals:    06/04/21 1335   BP: 116/66   Pulse: 79   SpO2: 99%   Weight: 65.3 kg (144 lb)   Height: 1.676 m (5' 6\")       Body mass index is 23.24 kg/m .    Sera Fulton LPN      "
07-Jul-2025

## 2025-07-07 NOTE — PROGRESS NOTE ADULT - SUBJECTIVE AND OBJECTIVE BOX
Subjective - patient seen and evaluated bedside. Sitting comfortably in bed. Denies CP, SOB, HA, dizziness, n/v/d    Review of Systems: negative x 10 systems except as noted above    Brief summary:  59yMale with RUL lung nodule pending workup    Significant/Eqaj64my events:  no acute events    PAST MEDICAL & SURGICAL HISTORY:  Ulcerative colitis      Chronic sinusitis      Afib  2015      Sleep apnea  not using c-pap      Lung nodules  watching      Renal calculus  current      [place] (hard of hearing), bilateral      DDD (degenerative disc disease), lumbar      Broken ribs  left posterior 9th and 10th      History of colonoscopy      Hernia  inguinal left      S/P ablation of atrial flutter      Presence of Watchman left atrial appendage closure device            acetaminophen     Tablet .. 650 milliGRAM(s) Oral every 6 hours PRN  aluminum hydroxide/magnesium hydroxide/simethicone Suspension 30 milliLiter(s) Oral every 4 hours PRN  dextrose 5%. 1000 milliLiter(s) IV Continuous <Continuous>  dextrose 5%. 1000 milliLiter(s) IV Continuous <Continuous>  dextrose 50% Injectable 25 Gram(s) IV Push once  dextrose 50% Injectable 12.5 Gram(s) IV Push once  dextrose 50% Injectable 25 Gram(s) IV Push once  dextrose Oral Gel 15 Gram(s) Oral once  diltiazem    Tablet 30 milliGRAM(s) Oral two times a day  glucagon  Injectable 1 milliGRAM(s) IntraMuscular once  insulin lispro (ADMELOG) corrective regimen sliding scale   SubCutaneous three times a day before meals  melatonin 3 milliGRAM(s) Oral at bedtime PRN  mesalamine ER Capsule 1500 milliGRAM(s) Oral two times a day  methylPREDNISolone sodium succinate Injectable 60 milliGRAM(s) IV Push daily  metoprolol tartrate Injectable 5 milliGRAM(s) IV Push every 6 hours PRN  midodrine. 10 milliGRAM(s) Oral three times a day PRN  ondansetron Injectable 4 milliGRAM(s) IV Push every 8 hours PRN  rosuvastatin 20 milliGRAM(s) Oral at bedtime  tamsulosin 0.4 milliGRAM(s) Oral at bedtime  MEDICATIONS  (PRN):  acetaminophen     Tablet .. 650 milliGRAM(s) Oral every 6 hours PRN Temp greater or equal to 38C (100.4F), Mild Pain (1 - 3)  aluminum hydroxide/magnesium hydroxide/simethicone Suspension 30 milliLiter(s) Oral every 4 hours PRN Dyspepsia  melatonin 3 milliGRAM(s) Oral at bedtime PRN Insomnia  metoprolol tartrate Injectable 5 milliGRAM(s) IV Push every 6 hours PRN for sustain Afib RVR > 130s bpm  midodrine. 10 milliGRAM(s) Oral three times a day PRN SBP <100  ondansetron Injectable 4 milliGRAM(s) IV Push every 8 hours PRN Nausea and/or Vomiting      Daily     Daily                               10.2   9.68  )-----------( 606      ( 07 Jul 2025 05:35 )             34.1   07-07    138  |  106  |  11.3  ----------------------------<  180[H]  4.9   |  20.0[L]  |  0.98    Ca    8.9      07 Jul 2025 05:35  Mg     2.2     07-07    TPro  7.1  /  Alb  3.2[L]  /  TBili  0.2[L]  /  DBili  x   /  AST  17  /  ALT  10  /  AlkPhos  61  07-07      PT/INR - ( 05 Jul 2025 13:57 )   PT: 13.0 sec;   INR: 1.12 ratio         PTT - ( 05 Jul 2025 13:57 )  PTT:34.1 sec      Objective:  T(C): 36.2 (07-07-25 @ 08:10), Max: 37.3 (07-06-25 @ 13:36)  HR: 88 (07-07-25 @ 08:10) (82 - 95)  BP: 133/85 (07-07-25 @ 08:10) (115/65 - 133/85)  RR: 18 (07-07-25 @ 08:10) (18 - 18)  SpO2: 94% (07-07-25 @ 08:10) (93% - 94%)  Wt(kg): --CAPILLARY BLOOD GLUCOSE      POCT Blood Glucose.: 154 mg/dL (07 Jul 2025 08:28)  I&O's Summary      Physical Exam  General: NAD  Neuro: A+O x 3, non-focal, speech clear and intact  Psych: Appropriate affect  HEENT:  NCAT, No conjuctival edema or icterus, no thrush.  Pulm: CTA, equal bilaterally  CV: RRR,  +S1S2  Abd: soft, NT, ND, +BS  Ext: +DP Pulses b/l, no edema  Skin: Warm, dry, intact          Imaging:    < from: CT Chest w/ IV Cont (07.05.25 @ 17:51) >  IMPRESSION:    12 mm right upper lobe lung nodule, suspicious for malignancy. No pleural   effusion.    Findings suggestive chronic inflammatory bowel disease in the colon.   Cannot exclude mild acute colitis of the transverse colon.    Moderate size hiatal hernia containing gastric fundus and upper body. No   gastric outlet obstruction. No bowel obstruction.    --- End of Report ---    < end of copied text >

## 2025-07-07 NOTE — DISCHARGE NOTE NURSING/CASE MANAGEMENT/SOCIAL WORK - FINANCIAL ASSISTANCE
[facility] provides services at a reduced cost to those who are determined to be eligible through [facility]’s financial assistance program. Information regarding [facility]’s financial assistance program can be found by going to [email or web address] or by calling [phone].

## 2025-07-07 NOTE — DISCHARGE NOTE PROVIDER - NSDCMRMEDTOKEN_GEN_ALL_CORE_FT
dilTIAZem 30 mg oral tablet: 1 tab(s) orally 2 times a day  econazole topical: 1 application 2 times a day  mesalamine 500 mg oral capsule, extended release: 3 cap(s) orally 2 times a day  midodrine 10 mg oral tablet: 1 tab(s) orally 3 times a day as needed for hypotension  potassium chloride 20 mEq oral tablet, extended release: 1 tab(s) orally  Probiotic Formula oral capsule: 1 cap(s) orally once a day  rosuvastatin 20 mg oral capsule: 1 cap(s) orally once a day  tamsulosin: 0.4 milligram(s) once a day (at bedtime)  torsemide 10 mg oral tablet: 1 tab(s) orally once a day   dilTIAZem 30 mg oral tablet: 1 tab(s) orally 2 times a day  mesalamine 500 mg oral capsule, extended release: 3 cap(s) orally 2 times a day  potassium chloride 20 mEq oral tablet, extended release: 1 tab(s) orally  predniSONE 20 mg oral tablet: 2 tab(s) orally Take prednisone 2 tabs daily until you see your GI doctor  Probiotic Formula oral capsule: 1 cap(s) orally once a day  rosuvastatin 20 mg oral capsule: 1 cap(s) orally once a day  tamsulosin: 0.4 milligram(s) once a day (at bedtime)

## 2025-07-07 NOTE — PROGRESS NOTE ADULT - SUBJECTIVE AND OBJECTIVE BOX
INTERVAL EVENTS:  Follow up for r/o adrenal insufficiency   Pt feels well today, no acute complaints.     MEDICATIONS  (STANDING):  dextrose 5%. 1000 milliLiter(s) (50 mL/Hr) IV Continuous <Continuous>  dextrose 5%. 1000 milliLiter(s) (100 mL/Hr) IV Continuous <Continuous>  dextrose 50% Injectable 25 Gram(s) IV Push once  dextrose 50% Injectable 12.5 Gram(s) IV Push once  dextrose 50% Injectable 25 Gram(s) IV Push once  dextrose Oral Gel 15 Gram(s) Oral once  diltiazem    Tablet 30 milliGRAM(s) Oral two times a day  glucagon  Injectable 1 milliGRAM(s) IntraMuscular once  insulin lispro (ADMELOG) corrective regimen sliding scale   SubCutaneous three times a day before meals  mesalamine ER Capsule 1500 milliGRAM(s) Oral two times a day  methylPREDNISolone sodium succinate Injectable 60 milliGRAM(s) IV Push daily  rosuvastatin 20 milliGRAM(s) Oral at bedtime  tamsulosin 0.4 milliGRAM(s) Oral at bedtime    MEDICATIONS  (PRN):  acetaminophen     Tablet .. 650 milliGRAM(s) Oral every 6 hours PRN Temp greater or equal to 38C (100.4F), Mild Pain (1 - 3)  aluminum hydroxide/magnesium hydroxide/simethicone Suspension 30 milliLiter(s) Oral every 4 hours PRN Dyspepsia  melatonin 3 milliGRAM(s) Oral at bedtime PRN Insomnia  metoprolol tartrate Injectable 5 milliGRAM(s) IV Push every 6 hours PRN for sustain Afib RVR > 130s bpm  midodrine. 10 milliGRAM(s) Oral three times a day PRN SBP <100  ondansetron Injectable 4 milliGRAM(s) IV Push every 8 hours PRN Nausea and/or Vomiting    Allergies  penicillin (Unknown)  penicillins (Unknown)    Vital Signs Last 24 Hrs  T(C): 36.2 (07 Jul 2025 08:10), Max: 37.3 (06 Jul 2025 13:36)  T(F): 97.2 (07 Jul 2025 08:10), Max: 99.1 (06 Jul 2025 13:36)  HR: 88 (07 Jul 2025 08:10) (82 - 95)  BP: 133/85 (07 Jul 2025 08:10) (115/65 - 133/85)  BP(mean): --  RR: 18 (07 Jul 2025 08:10) (18 - 18)  SpO2: 94% (07 Jul 2025 08:10) (93% - 94%)    Parameters below as of 07 Jul 2025 08:10  Patient On (Oxygen Delivery Method): room air    PHYSICAL EXAM:  General: No apparent distress  Respiratory: Normal rate, effort  Cardiac: +S1, S2, no m/r/g  GI: +BS, soft, non tender, non distended  Extremities: No peripheral edema, no pedal lesions  Neuro: A+O X3    LABS:                        10.2   9.68  )-----------( 606      ( 07 Jul 2025 05:35 )             34.1     07-07    138  |  106  |  11.3  ----------------------------<  180[H]  4.9   |  20.0[L]  |  0.98    Ca    8.9      07 Jul 2025 05:35  Mg     2.2     07-07    TPro  7.1  /  Alb  3.2[L]  /  TBili  0.2[L]  /  DBili  x   /  AST  17  /  ALT  10  /  AlkPhos  61  07-07    Urinalysis Basic - ( 07 Jul 2025 05:35 )    Color: x / Appearance: x / SG: x / pH: x  Gluc: 180 mg/dL / Ketone: x  / Bili: x / Urobili: x   Blood: x / Protein: x / Nitrite: x   Leuk Esterase: x / RBC: x / WBC x   Sq Epi: x / Non Sq Epi: x / Bacteria: x    POCT Blood Glucose.: 298 mg/dL (07-07-25 @ 11:59)  POCT Blood Glucose.: 154 mg/dL (07-07-25 @ 08:28)

## 2025-07-07 NOTE — DIETITIAN INITIAL EVALUATION ADULT - ADD RECOMMEND
Tolerate meal well with improved appetite  Rx: MVI daily, vitamin C (500mg daily) to aid in wound healing.   Encourage diet compliance   Encourage HBV protein sources

## 2025-07-07 NOTE — DIETITIAN INITIAL EVALUATION ADULT - NSICDXPASTMEDICALHX_GEN_ALL_CORE_FT
PAST MEDICAL HISTORY:  Afib 2015    Broken ribs left posterior 9th and 10th    Chronic sinusitis     DDD (degenerative disc disease), lumbar     [place] (hard of hearing), bilateral     Lung nodules watching    Renal calculus current    Sleep apnea not using c-pap    Ulcerative colitis

## 2025-07-07 NOTE — DIETITIAN NUTRITION RISK NOTIFICATION - TREATMENT: THE FOLLOWING DIET HAS BEEN RECOMMENDED
Diet, Regular:   Consistent Carbohydrate {No Snacks} (CSTCHO)  DASH/TLC {Sodium & Cholesterol Restricted} (DASH) (07-06-25 @ 23:33) [Active]      
0

## 2025-07-07 NOTE — DIETITIAN INITIAL EVALUATION ADULT - NS FNS DIET ORDER
Diet, Regular:   Consistent Carbohydrate {No Snacks} (CSTCHO)  DASH/TLC {Sodium & Cholesterol Restricted} (DASH) (07-06-25 @ 23:33)

## 2025-07-07 NOTE — DIETITIAN INITIAL EVALUATION ADULT - OTHER INFO
59 year old male with a PMHx of Ulcerative Colitis (on mesalamine, recent flare 2 weeks ago treated with prednisone), SALLY, atrial fibrillation s/p Watchman and ablation, CHF of unknown type, hypotension (on midodrine) and BPH who presented to the ED on 7/5 w/ 1 week of abdominal pain and inability to tolerate PO intake. Also endorsed right flank pain, hematochezia, shortness of breath, vomiting, generalized weakness, and malaise. In ED CT C/A/P revealed findings consistent with transverse colitis and incidental findings of 12mm RUL nodule. C.diff negative. Course complicated by hypotension 97/70 which improved w/ midodrine and IV fluids. Endocrine consulted for potential adrenal insufficiency given recent steroids. Cosyntropin stimulation test positive. Morning cortisol low (4.4). Thoracic surgery consulted for pulm nodule.

## 2025-07-07 NOTE — DISCHARGE NOTE PROVIDER - NSDCCPCAREPLAN_GEN_ALL_CORE_FT
PRINCIPAL DISCHARGE DIAGNOSIS  Diagnosis: Acute colitis  Assessment and Plan of Treatment: - Ongoing   - Follow up with primary care provider within 1 week   - Follow up with gastroenterologist within 1 week for CT Scan  - Continue steroid medication         SECONDARY DISCHARGE DIAGNOSES  Diagnosis: Steroid dependence  Assessment and Plan of Treatment: - Ongoing   - Follow up with gastroenterologist within 1 week to taper steroids    Diagnosis: Chronic atrial fibrillation  Assessment and Plan of Treatment: - Ongoing   - Continue diltiazem   - Follow up with cardiologist within 1 week    Diagnosis: Hematochezia  Assessment and Plan of Treatment: - Resolved    Follow up with gastroenterologist within 1 week    Diagnosis: Low blood pressure  Assessment and Plan of Treatment: - Ongoing   - Continue midodrine   - Follow up with primary care provider within 1 week    Diagnosis: Lung nodule  Assessment and Plan of Treatment: - Ongoing   - Follow up with thoracic surgery outpatient for bronchoscopy    Diagnosis: Chronic heart failure  Assessment and Plan of Treatment: - Ongoing   - Follow up with cardiology within 1 week    Diagnosis: Chronic hypokalemia  Assessment and Plan of Treatment: - Ongoing   - Continue potassium chloride tablets  - Follow up with primary care provider within 1 week    Diagnosis: Benign prostatic hyperplasia  Assessment and Plan of Treatment: - Ongoing   - Conitnue tamulosin   - Follow up with primary care provider within 1 week    Diagnosis: Hyperlipemia  Assessment and Plan of Treatment: - Ongoing   - Continue rosuvastatin  - Follow up with primary care provider within 1 week      Diagnosis: Obstructive sleep apnea  Assessment and Plan of Treatment: - Ongoing   - Continue bipap  - Follow up with primary care provider within 1 week     PRINCIPAL DISCHARGE DIAGNOSIS  Diagnosis: Acute colitis  Assessment and Plan of Treatment: You were seen for acute colitis. We suspect this is a flare up of your ulcerative colitis.  -  Follow up with primary care provider within 1 week   - Follow up with gastroenterologist within 1 week for CT Enterography  - Take prednisone 40mg daily until your GI appointment        SECONDARY DISCHARGE DIAGNOSES  Diagnosis: Lung nodule  Assessment and Plan of Treatment: You were found to have a 12mm nodule in your right upper lung when you underwent a CT scan.   - Follow up with thoracic surgery outpatient for bronchoscopy

## 2025-07-07 NOTE — PROGRESS NOTE ADULT - NS ATTEND AMEND GEN_ALL_CORE FT
60 yo M with PMH UC on mesalamine who presented with UC flare. He is s/p IV methylpred and reports resolution of symptoms with 1 formed BM with no blood and tolerating regular diet without nausea or vomiting. ESR stable today. Patient requesting to go home. Please give pred 40 PO QD, mesalamine 4.8G and proctofoam and patient to follow up with primary GI
plan discussed with NP and changes incorporated in the note.    agree with the plan above  patient seen and examined  patient noted to have regular soda at bedside    59M with PMH ulcerative colotis, CHF who presents for nausea, vomiting, PO intolerance, diarrhea, fatigue. Endocrine consulted for question of adrenal insufficiency. Patient has been on and off steroids for more than 20 years, now weaned off over 1 month. Not been on any steroids for 2 weeks.  now also found to have new onset diabetes, likely due to long standing steroid use  patient warned about diet  will need follow up with us regarding diet as well  no meds for discharge as patient likely needs to fix diet  will need outpatient meter teach etc  patient going home on steroids  warned that he should not discontinue steroids and to stay on prednisone 5mg as he would need a longer taper in order to successfully come off of steroids entirely (since he has partial adrenal insufficiency at this point but likely will need stress dose steroids as he likely is unable to mount a stress response himself)

## 2025-07-07 NOTE — DISCHARGE NOTE PROVIDER - ATTENDING DISCHARGE PHYSICAL EXAMINATION:
Vital Signs Last 24 Hrs  T(C): 36.2 (07 Jul 2025 12:52), Max: 37.3 (06 Jul 2025 13:36)  T(F): 97.1 (07 Jul 2025 12:52), Max: 99.1 (06 Jul 2025 13:36)  HR: 92 (07 Jul 2025 12:52) (82 - 95)  BP: 132/71 (07 Jul 2025 12:52) (115/65 - 133/85)  BP(mean): --  RR: 18 (07 Jul 2025 12:52) (18 - 18)  SpO2: 94% (07 Jul 2025 12:52) (93% - 94%)    Parameters below as of 07 Jul 2025 12:52  Patient On (Oxygen Delivery Method): room air    PHYSICAL EXAM:  GENERAL: Not in acute distress, lying comfortably  HEAD:  Atraumatic, Normocephalic  EYES: EOMI, PERRLA, conjunctiva and sclera clear  NECK: Supple, No JVD, Normal thyroid  NERVOUS SYSTEM:  Alert & Oriented X3, No gross focal deficits  CHEST/LUNG: Clear to auscultation bilaterally; No rales, rhonchi, wheezing, or rubs  HEART: Regular rate and rhythm; No murmurs, rubs, or gallops  ABDOMEN: Soft, Nontender, Nondistended; Bowel sounds present  EXTREMITIES:  No clubbing, cyanosis, or edema  SKIN: No rashes or lesions

## 2025-07-07 NOTE — PROGRESS NOTE ADULT - ASSESSMENT
60 yo M with PMH UC (on mesalamine), CHF who presents for nausea, vomiting, PO intolerance, diarrhea, fatigue. Patient seen by endocrinology and underwent ACTH stim test to assess for adrenal insufficiency. Mild leukocytosis, elevated ESR, anemia, low albumin on initial labs. C. diff and GI PCR negative.     #Ulcerative Colitis  Concerning for IBD flare given sx. Began IV prednisolone yesterday (7/6) and pt notes improvement in sx along with resolution of n/v and diarrhea.   - Switch to PO Prednisone 40mg today. If pt tolerates and continues to improve, OK to discharge on PO with close outpatient f/u.   - Mesalamine 4.8 g/day  - Trend ESR/CRP daily  - Monitor stool counts  - CT enterography to assess for stricture  - F/u fecal calprotectin  - F/u Quant Gold, HBV surface antibody, antigen, and core antibody. Of note, pt reports having tried Remicade in the past but d/c due to allergic reaction.

## 2025-07-07 NOTE — DISCHARGE NOTE PROVIDER - CARE PROVIDER_API CALL
PCP,   Phone: (   )    -  Fax: (   )    -  Follow Up Time:     GI,   Phone: (   )    -  Fax: (   )    -  Follow Up Time:     Thoracic surgery,   Phone: (   )    -  Fax: (   )    -  Follow Up Time:    NAKIA JEAN BAPTISTE R  [address] [place] [address]  Phone: ()-  Fax: ()-  Follow Up Time: 1 week    Will Elias  Gastroenterology  [address] [facility] [facility] [facility] [place] [address]  Phone: [phone]  Fax: [phone]  Follow Up Time: 1 week    Campbell Vaughan  Thoracic and Cardiac Surgery  [address] [place] [address]  Phone: [phone]  Fax: [phone]  Follow Up Time: 1 week

## 2025-07-07 NOTE — DISCHARGE NOTE PROVIDER - HOSPITAL COURSE
Patient is a 59 year old male with a PMHx of Ulcerative Colitis (on mesalamine, recent flare 2 weeks ago treated with prednisone), SALLY, atrial fibrillation s/p Watchman and ablation, CHF of unknown type, hypotension (on midodrine) and BPH who presented to the ED on 7/5 w/ 1 week of abdominal pain and inability to tolerate PO intake. Also endorsed right flank pain, hematochezia, shortness of breath, vomiting, generalized weakness, and malaise. In ED CT C/A/P revealed findings consistent with transverse colitis and incidental findings of 12mm RUL nodule. C.diff negative. Course complicated by hypotension 97/70 which improved w/ midodrine and IV fluids. Endocrine consulted for potential adrenal insufficiency given recent steroids. Cosyntropin stimulation test positive. Morning cortisol low (4.4). Thoracic surgery consulted for pulm nodule and scheduled outpatient bronchoscopy w/ biospy. GI consulted and recommended IV solumedrol for UC flare with eventual taper with outpatient GI and outpatient CT Enterography.     Discharge Diagnosis   #Colitis 2/2 UC flare up vs infectious colitis  #hematochezia -resolved  #leukocytosis 2/2 reactive vs infectious  #Borderline blood pressure 2/2 chronic orthostatics vs adrenal insufficiency  #Potential Adrenal Insufficiency  #Lung nodule  #Afib s/p watchman and ablation  #CHF  #Hypokalemia  #Hypokalemia  #BPH  #HLD  #SALLY   Patient is a 59 year old male with a PMHx of Ulcerative Colitis (on mesalamine, recent flare 2 weeks ago treated with prednisone), SALLY, atrial fibrillation s/p Watchman and ablation, CHF of unknown type, hypotension (on midodrine) and BPH who presented to the ED on 7/5 w/ 1 week of abdominal pain and inability to tolerate PO intake. Also endorsed right flank pain, hematochezia, shortness of breath, vomiting, generalized weakness, and malaise. In ED CT C/A/P revealed findings consistent with transverse colitis and incidental findings of 12mm RUL nodule. C.diff negative. Course complicated by hypotension 97/70 which improved w/ midodrine and IV fluids. Endocrine consulted for potential adrenal insufficiency given recent steroids. Cosyntropin stimulation test positive. Morning cortisol low (4.4). Thoracic surgery consulted for pulm nodule and scheduled outpatient bronchoscopy w/ biospy. GI consulted and recommended IV solumedrol for UC flare, switched to prednisone 40mg daily PO without taper for 1 week until follow-up outpatient GI and outpatient CT Enterography.     Discharge Diagnosis   #Colitis 2/2 UC flare up vs infectious colitis  #hematochezia -resolved  #leukocytosis 2/2 reactive vs infectious  #Borderline blood pressure 2/2 chronic orthostatics vs adrenal insufficiency  #Potential Adrenal Insufficiency  #Lung nodule  #Afib s/p watchman and ablation  #CHF  #Hypokalemia  #Hypokalemia  #BPH  #HLD  #SALLY   Patient is a 59 year old male with a PMHx of Ulcerative Colitis (on mesalamine, recent flare 2 weeks ago treated with prednisone), SALLY, atrial fibrillation s/p Watchman and ablation, CHF of unknown type, hypotension (on midodrine) and BPH who presented to the ED on 7/5 w/ 1 week of abdominal pain and inability to tolerate PO intake. Also endorsed right flank pain, hematochezia, shortness of breath, vomiting, generalized weakness, and malaise. In ED CT C/A/P revealed findings consistent with transverse colitis and incidental findings of 12mm RUL nodule. C.diff negative. Course complicated by hypotension 97/70 which improved w/ midodrine and IV fluids. Endocrine consulted for potential adrenal insufficiency given recent steroids. Cosyntropin stimulation test positive. Morning cortisol low (4.4). Thoracic surgery consulted for pulm nodule and scheduled outpatient bronchoscopy w/ biopsy. GI consulted and recommended IV solumedrol for UC flare, switched to prednisone 40mg daily PO without taper for 1 week until follow-up outpatient GI and outpatient CT Enterography.     Discharge Diagnosis   #Colitis 2/2 UC flare up vs infectious colitis  #hematochezia -resolved  #leukocytosis 2/2 reactive vs infectious  #Borderline blood pressure 2/2 chronic orthostatics vs adrenal insufficiency  #Adrenal Insufficiency  #Lung nodule  #Afib s/p watchman and ablation  #CHF  #Hypokalemia  #Hypokalemia  #BPH  #HLD  #SALLY

## 2025-07-07 NOTE — DISCHARGE NOTE NURSING/CASE MANAGEMENT/SOCIAL WORK - NSDCPEFALRISK_GEN_ALL_CORE
For information on Fall & Injury Prevention, visit: [email or web address] OR  [email or web address] OR  https://www.cdc.gov/steadi/patient.html

## 2025-07-07 NOTE — DIETITIAN INITIAL EVALUATION ADULT - PERTINENT LABORATORY DATA
07-07    138  |  106  |  11.3  ----------------------------<  180[H]  4.9   |  20.0[L]  |  0.98    Ca    8.9      07 Jul 2025 05:35  Mg     2.2     07-07    TPro  7.1  /  Alb  3.2[L]  /  TBili  0.2[L]  /  DBili  x   /  AST  17  /  ALT  10  /  AlkPhos  61  07-07  POCT Blood Glucose.: 154 mg/dL (07-07-25 @ 08:28)  A1C with Estimated Average Glucose Result: 6.7 % (07-06-25 @ 03:44)

## 2025-07-07 NOTE — DIETITIAN INITIAL EVALUATION ADULT - HEIGHT FOR BMI (INCHES)
----- Message from Luna HOPE MD sent at 4/30/2024 11:19 AM CDT -----  Please let pt know that Pap and HPV are negative.   
Emailed patient with lab results and told patient to call office if they have any questions  
7

## 2025-07-07 NOTE — DIETITIAN INITIAL EVALUATION ADULT - ORAL INTAKE PTA/DIET HISTORY
Met with pt who reports 18# weight loss x 1 week with decreased po intake now improving, sitting and eating breakfast at this time. Nausea and abd pain now improved. Provided Heart healthy cons [facility] nutrition therapy education materials.

## 2025-07-07 NOTE — PROGRESS NOTE ADULT - SUBJECTIVE AND OBJECTIVE BOX
Chief Complaint:  Patient is a 59y old  Male who presents with a chief complaint of Abdominal pain, hypotension (07 Jul 2025 09:14)      HPI/ 24 hr events: This is a 59y Male with a past medical history significant for UC (on mesalamine), CHF who presents with N/V/diarrhea, body aches.     Patient reports long standing UC diagnosis. He has been maintained on PO mesalamine and has multiple flares requiring PO prednisone. He reports a flare about 2 months ago and stopping the prednisone about 6 weeks ago. He had been feeling well until the past week where he developed body aches, nausea and PO intolerance. He also notes have loose watery stool 3 times per day, nonbloody. His prior flares have never presented with nausea or PO intolerance. His usual stools are formed and occur once per day. Considering this, he presented for evaluation. Of note, takes midodrine at home for hypotension.  Denies prior enterography.     GI was consulted and is following up. Patient was seen at bedside. Prednisone was started yesterday, pt reports improvement. Tolerating PO intake, denies any n/v or abdominal pain. Reports improvement in stool formation, reporting solid bowel movement this AM.       REVIEW OF SYSTEMS:   General: Negative  HEENT: Negative  CV: Negative  Respiratory: Negative  GI: See HPI  : Negative  MSK: Negative  Hematologic: Negative  Skin: Negative    MEDICATIONS:   MEDICATIONS  (STANDING):  dextrose 5%. 1000 milliLiter(s) (50 mL/Hr) IV Continuous <Continuous>  dextrose 5%. 1000 milliLiter(s) (100 mL/Hr) IV Continuous <Continuous>  dextrose 50% Injectable 25 Gram(s) IV Push once  dextrose 50% Injectable 12.5 Gram(s) IV Push once  dextrose 50% Injectable 25 Gram(s) IV Push once  dextrose Oral Gel 15 Gram(s) Oral once  diltiazem    Tablet 30 milliGRAM(s) Oral two times a day  glucagon  Injectable 1 milliGRAM(s) IntraMuscular once  insulin lispro (ADMELOG) corrective regimen sliding scale   SubCutaneous three times a day before meals  mesalamine ER Capsule 1500 milliGRAM(s) Oral two times a day  methylPREDNISolone sodium succinate Injectable 60 milliGRAM(s) IV Push daily  rosuvastatin 20 milliGRAM(s) Oral at bedtime  tamsulosin 0.4 milliGRAM(s) Oral at bedtime    MEDICATIONS  (PRN):  acetaminophen     Tablet .. 650 milliGRAM(s) Oral every 6 hours PRN Temp greater or equal to 38C (100.4F), Mild Pain (1 - 3)  aluminum hydroxide/magnesium hydroxide/simethicone Suspension 30 milliLiter(s) Oral every 4 hours PRN Dyspepsia  melatonin 3 milliGRAM(s) Oral at bedtime PRN Insomnia  metoprolol tartrate Injectable 5 milliGRAM(s) IV Push every 6 hours PRN for sustain Afib RVR > 130s bpm  midodrine. 10 milliGRAM(s) Oral three times a day PRN SBP <100  ondansetron Injectable 4 milliGRAM(s) IV Push every 8 hours PRN Nausea and/or Vomiting            DIET:  Diet, Regular:   Consistent Carbohydrate No Snacks (CSTCHO)  DASH/TLC Sodium & Cholesterol Restricted (DASH) (07-06-25 @ 23:33) [Active]          ALLERGIES:   Allergies    penicillin (Unknown)  penicillins (Unknown)    Intolerances        VITAL SIGNS:   Vital Signs Last 24 Hrs  T(C): 36.2 (07 Jul 2025 08:10), Max: 37.3 (06 Jul 2025 13:36)  T(F): 97.2 (07 Jul 2025 08:10), Max: 99.1 (06 Jul 2025 13:36)  HR: 88 (07 Jul 2025 08:10) (82 - 95)  BP: 133/85 (07 Jul 2025 08:10) (115/65 - 133/85)  BP(mean): --  RR: 18 (07 Jul 2025 08:10) (18 - 18)  SpO2: 94% (07 Jul 2025 08:10) (93% - 94%)    Parameters below as of 07 Jul 2025 08:10  Patient On (Oxygen Delivery Method): room air      I&O's Summary      PHYSICAL EXAM:   GENERAL:  NAD  HEENT:  NC/AT, conjunctiva clear, sclera anicteric  CHEST:  No increased effort  ABDOMEN:  Soft, non-tender, non-distended, no rebound or guarding  SKIN:  Warm, dry  NEURO:  Calm, cooperative    LABS:                        10.2   9.68  )-----------( 606      ( 07 Jul 2025 05:35 )             34.1     Hemoglobin: 10.2 g/dL (07-07-25 @ 05:35)  Hemoglobin: 10.1 g/dL (07-06-25 @ 03:44)  Hemoglobin: 11.2 g/dL (07-05-25 @ 13:57)    07-07    138  |  106  |  11.3  ----------------------------<  180[H]  4.9   |  20.0[L]  |  0.98    Ca    8.9      07 Jul 2025 05:35  Mg     2.2     07-07    TPro  7.1  /  Alb  3.2[L]  /  TBili  0.2[L]  /  DBili  x   /  AST  17  /  ALT  10  /  AlkPhos  61  07-07    LIVER FUNCTIONS - ( 07 Jul 2025 05:35 )  Alb: 3.2 g/dL / Pro: 7.1 g/dL / ALK PHOS: 61 U/L / ALT: 10 U/L / AST: 17 U/L / GGT: x             PT/INR - ( 05 Jul 2025 13:57 )   PT: 13.0 sec;   INR: 1.12 ratio         PTT - ( 05 Jul 2025 13:57 )  PTT:34.1 sec    Urinalysis with Rflx Culture (collected 05 Jul 2025 15:12)            C-Reactive Protein: 8 mg/L (07-07-25 @ 05:35)            GI PCR Panel: NotDetec (07-05-25 @ 20:15)                      RADIOLOGY & ADDITIONAL STUDIES:      ACC: [id number] EXAM:  CT ABDOMEN AND PELVIS IC   ORDERED BY: RONNY BRAND     ACC: [id number] EXAM:  CT CHEST IC   ORDERED BY: RONNY BRAND     PROCEDURE DATE:  07/05/2025          INTERPRETATION:  CLINICAL INFORMATION: Pleural effusion. Shortness of   breath. Abdominal pain.    COMPARISON: PET/CT 4/24/2024. CT abdomen and pelvis 12/15/2014.    CONTRAST/COMPLICATIONS:  IV Contrast: Omnipaque 350 (accession [id number]), IV contrast documented   in unlinked concurrent exam (accession [id number]) 90 cc administered   10   cc discarded  Oral Contrast: NONE.    PROCEDURE:  CT of the Chest, Abdomen and Pelvis was performed.  Sagittal and coronal reformats were performed.    FINDINGS:  CHEST:  LUNGS AND LARGE AIRWAYS: Patent central airways. 12 mm spiculated nodule   right upper lobe overlying the fissure, developed since prior study.  PLEURA: No pleural effusion. No pneumothorax.  VESSELS: No thoracic aortic aneurysm. No central pulmonary emboli.  HEART: Heart size is normal. Left atrial closure device. No pericardial   effusion.  MEDIASTINUM AND IVAN: Decrease in size of mild mediastinal   lymphadenopathy. Many of the lymph nodes are again calcified.  CHEST WALL AND LOWER NECK: Within normal limits.    ABDOMEN AND PELVIS:  LIVER: Mild hepatic steatosis.  BILE DUCTS: Normal caliber.  GALLBLADDER: Within normal limits.  SPLEEN: Within normal limits.  PANCREAS: Within normal limits.  ADRENALS: Within normal limits.  KIDNEYS/URETERS: 4 mm nonobstructive calculus upper pole right kidney. 3   mm nonobstructive calculus left kidney. No hydronephrosis or ureteral   calculi. Subcentimeter hypodense renal lesions bilaterally, too small to   characterize, probably cysts.    BLADDER: Irregular shaped bladder with urachal remnant at the dome and   lobulated/diverticular contour and the posterior aspect.  REPRODUCTIVE ORGANS: No significant prostatic enlargement.    BOWEL: Moderate size hiatal hernia containing gastric fundus and upper   body. No bowel obstruction. Appendix is normal. Colonic diverticulosis.   No evidence of acute diverticulitis. Prominent submucosal fat in the   colon and rectum could be a result of chronic inflammatory bowel disease.   Mild wall thickening of transverse colon.  PERITONEUM/RETROPERITONEUM: Within normal limits.  VESSELS: Atherosclerotic changes. No abdominal aortic aneurysm.  LYMPH NODES: Borderline sized portal caval lymph nodes. Otherwise, no   retroperitoneal lymphadenopathy.  ABDOMINAL WALL: 6.5 cm fat-containing umbilical hernia.  BONES: Degenerative changes.    IMPRESSION:    12 mm right upper lobe lung nodule, suspicious for malignancy. No pleural   effusion.    Findings suggestive chronic inflammatory bowel disease in the colon.   Cannot exclude mild acute colitis of the transverse colon.    Moderate size hiatal hernia containing gastric fundus and upper body. No   gastric outlet obstruction. No bowel obstruction.    --- End of Report ---            GABRIELA NAVARRETE MD; Attending Radiologist  This document has been electronically signed. Jul 5 2025  6:13PM  07-05-25 @ 17:51       Chief Complaint:  Patient is a 59y old  Male who presents with a chief complaint of Abdominal pain, hypotension (07 Jul 2025 09:14)    HPI/ 24 hr events: This is a 59y Male with a past medical history significant for UC (on mesalamine), CHF who presents with N/V/diarrhea, body aches.     Patient reports long standing UC diagnosis. He has been maintained on PO mesalamine and has multiple flares requiring PO prednisone. He reports a flare about 2 months ago and stopping the prednisone about 6 weeks ago. He had been feeling well until the past week where he developed body aches, nausea and PO intolerance. He also notes have loose watery stool 3 times per day, nonbloody. His prior flares have never presented with nausea or PO intolerance. His usual stools are formed and occur once per day. Considering this, he presented for evaluation. Of note, takes midodrine at home for hypotension.  Denies prior enterography.     GI was consulted and is following up. Patient was seen at bedside. Prednisone was started yesterday, pt reports improvement. Tolerating PO intake, denies any n/v or abdominal pain. Reports improvement in stool formation, reporting solid bowel movement this AM.     REVIEW OF SYSTEMS:   General: Negative  HEENT: Negative  CV: Negative  Respiratory: Negative  GI: See HPI  : Negative  MSK: Negative  Hematologic: Negative  Skin: Negative    MEDICATIONS:   MEDICATIONS  (STANDING):  dextrose 5%. 1000 milliLiter(s) (50 mL/Hr) IV Continuous <Continuous>  dextrose 5%. 1000 milliLiter(s) (100 mL/Hr) IV Continuous <Continuous>  dextrose 50% Injectable 25 Gram(s) IV Push once  dextrose 50% Injectable 12.5 Gram(s) IV Push once  dextrose 50% Injectable 25 Gram(s) IV Push once  dextrose Oral Gel 15 Gram(s) Oral once  diltiazem    Tablet 30 milliGRAM(s) Oral two times a day  glucagon  Injectable 1 milliGRAM(s) IntraMuscular once  insulin lispro (ADMELOG) corrective regimen sliding scale   SubCutaneous three times a day before meals  mesalamine ER Capsule 1500 milliGRAM(s) Oral two times a day  methylPREDNISolone sodium succinate Injectable 60 milliGRAM(s) IV Push daily  rosuvastatin 20 milliGRAM(s) Oral at bedtime  tamsulosin 0.4 milliGRAM(s) Oral at bedtime    MEDICATIONS  (PRN):  acetaminophen     Tablet .. 650 milliGRAM(s) Oral every 6 hours PRN Temp greater or equal to 38C (100.4F), Mild Pain (1 - 3)  aluminum hydroxide/magnesium hydroxide/simethicone Suspension 30 milliLiter(s) Oral every 4 hours PRN Dyspepsia  melatonin 3 milliGRAM(s) Oral at bedtime PRN Insomnia  metoprolol tartrate Injectable 5 milliGRAM(s) IV Push every 6 hours PRN for sustain Afib RVR > 130s bpm  midodrine. 10 milliGRAM(s) Oral three times a day PRN SBP <100  ondansetron Injectable 4 milliGRAM(s) IV Push every 8 hours PRN Nausea and/or Vomiting    DIET:  Diet, Regular:   Consistent Carbohydrate No Snacks (CSTCHO)  DASH/TLC Sodium & Cholesterol Restricted (DASH) (07-06-25 @ 23:33) [Active]      ALLERGIES:   Allergies    penicillin (Unknown)  penicillins (Unknown)  Intolerances    VITAL SIGNS:   Vital Signs Last 24 Hrs  T(C): 36.2 (07 Jul 2025 08:10), Max: 37.3 (06 Jul 2025 13:36)  T(F): 97.2 (07 Jul 2025 08:10), Max: 99.1 (06 Jul 2025 13:36)  HR: 88 (07 Jul 2025 08:10) (82 - 95)  BP: 133/85 (07 Jul 2025 08:10) (115/65 - 133/85)  BP(mean): --  RR: 18 (07 Jul 2025 08:10) (18 - 18)  SpO2: 94% (07 Jul 2025 08:10) (93% - 94%)    Parameters below as of 07 Jul 2025 08:10  Patient On (Oxygen Delivery Method): room air      I&O's Summary      PHYSICAL EXAM:   GENERAL:  NAD  HEENT:  NC/AT, conjunctiva clear, sclera anicteric  CHEST:  No increased effort  ABDOMEN:  Soft, non-tender, non-distended, no rebound or guarding  SKIN:  Warm, dry  NEURO:  Calm, cooperative    LABS:                        10.2   9.68  )-----------( 606      ( 07 Jul 2025 05:35 )             34.1     Hemoglobin: 10.2 g/dL (07-07-25 @ 05:35)  Hemoglobin: 10.1 g/dL (07-06-25 @ 03:44)  Hemoglobin: 11.2 g/dL (07-05-25 @ 13:57)    07-07    138  |  106  |  11.3  ----------------------------<  180[H]  4.9   |  20.0[L]  |  0.98    Ca    8.9      07 Jul 2025 05:35  Mg     2.2     07-07    TPro  7.1  /  Alb  3.2[L]  /  TBili  0.2[L]  /  DBili  x   /  AST  17  /  ALT  10  /  AlkPhos  61  07-07    LIVER FUNCTIONS - ( 07 Jul 2025 05:35 )  Alb: 3.2 g/dL / Pro: 7.1 g/dL / ALK PHOS: 61 U/L / ALT: 10 U/L / AST: 17 U/L / GGT: x             PT/INR - ( 05 Jul 2025 13:57 )   PT: 13.0 sec;   INR: 1.12 ratio         PTT - ( 05 Jul 2025 13:57 )  PTT:34.1 sec    Urinalysis with Rflx Culture (collected 05 Jul 2025 15:12)    C-Reactive Protein: 8 mg/L (07-07-25 @ 05:35)    GI PCR Panel: NotDetec (07-05-25 @ 20:15)      RADIOLOGY & ADDITIONAL STUDIES:      ACC: [id number] EXAM:  CT ABDOMEN AND PELVIS IC   ORDERED BY: RONNY BRAND     ACC: [id number] EXAM:  CT CHEST IC   ORDERED BY: RONNY BRAND     PROCEDURE DATE:  07/05/2025          INTERPRETATION:  CLINICAL INFORMATION: Pleural effusion. Shortness of   breath. Abdominal pain.    COMPARISON: PET/CT 4/24/2024. CT abdomen and pelvis 12/15/2014.    CONTRAST/COMPLICATIONS:  IV Contrast: Omnipaque 350 (accession [id number]), IV contrast documented   in unlinked concurrent exam (accession [id number]) 90 cc administered   10   cc discarded  Oral Contrast: NONE.    PROCEDURE:  CT of the Chest, Abdomen and Pelvis was performed.  Sagittal and coronal reformats were performed.    FINDINGS:  CHEST:  LUNGS AND LARGE AIRWAYS: Patent central airways. 12 mm spiculated nodule   right upper lobe overlying the fissure, developed since prior study.  PLEURA: No pleural effusion. No pneumothorax.  VESSELS: No thoracic aortic aneurysm. No central pulmonary emboli.  HEART: Heart size is normal. Left atrial closure device. No pericardial   effusion.  MEDIASTINUM AND IVAN: Decrease in size of mild mediastinal   lymphadenopathy. Many of the lymph nodes are again calcified.  CHEST WALL AND LOWER NECK: Within normal limits.    ABDOMEN AND PELVIS:  LIVER: Mild hepatic steatosis.  BILE DUCTS: Normal caliber.  GALLBLADDER: Within normal limits.  SPLEEN: Within normal limits.  PANCREAS: Within normal limits.  ADRENALS: Within normal limits.  KIDNEYS/URETERS: 4 mm nonobstructive calculus upper pole right kidney. 3   mm nonobstructive calculus left kidney. No hydronephrosis or ureteral   calculi. Subcentimeter hypodense renal lesions bilaterally, too small to   characterize, probably cysts.    BLADDER: Irregular shaped bladder with urachal remnant at the dome and   lobulated/diverticular contour and the posterior aspect.  REPRODUCTIVE ORGANS: No significant prostatic enlargement.    BOWEL: Moderate size hiatal hernia containing gastric fundus and upper   body. No bowel obstruction. Appendix is normal. Colonic diverticulosis.   No evidence of acute diverticulitis. Prominent submucosal fat in the   colon and rectum could be a result of chronic inflammatory bowel disease.   Mild wall thickening of transverse colon.  PERITONEUM/RETROPERITONEUM: Within normal limits.  VESSELS: Atherosclerotic changes. No abdominal aortic aneurysm.  LYMPH NODES: Borderline sized portal caval lymph nodes. Otherwise, no   retroperitoneal lymphadenopathy.  ABDOMINAL WALL: 6.5 cm fat-containing umbilical hernia.  BONES: Degenerative changes.    IMPRESSION:    12 mm right upper lobe lung nodule, suspicious for malignancy. No pleural   effusion.    Findings suggestive chronic inflammatory bowel disease in the colon.   Cannot exclude mild acute colitis of the transverse colon.    Moderate size hiatal hernia containing gastric fundus and upper body. No   gastric outlet obstruction. No bowel obstruction.    --- End of Report ---            GABRIELA NAVARRETE MD; Attending Radiologist  This document has been electronically signed. Jul 5 2025  6:13PM  07-05-25 @ 17:51

## 2025-07-07 NOTE — DIETITIAN INITIAL EVALUATION ADULT - PERTINENT MEDS FT
MEDICATIONS  (STANDING):  diltiazem    Tablet 30 milliGRAM(s) Oral two times a day  glucagon  Injectable 1 milliGRAM(s) IntraMuscular once  insulin lispro (ADMELOG) corrective regimen sliding scale   SubCutaneous three times a day before meals  mesalamine ER Capsule 1500 milliGRAM(s) Oral two times a day  methylPREDNISolone sodium succinate Injectable 60 milliGRAM(s) IV Push daily  rosuvastatin 20 milliGRAM(s) Oral at bedtime  tamsulosin 0.4 milliGRAM(s) Oral at bedtime    MEDICATIONS  (PRN):  acetaminophen     Tablet .. 650 milliGRAM(s) Oral every 6 hours PRN Temp greater or equal to 38C (100.4F), Mild Pain (1 - 3)  aluminum hydroxide/magnesium hydroxide/simethicone Suspension 30 milliLiter(s) Oral every 4 hours PRN Dyspepsia  melatonin 3 milliGRAM(s) Oral at bedtime PRN Insomnia  metoprolol tartrate Injectable 5 milliGRAM(s) IV Push every 6 hours PRN for sustain Afib RVR > 130s bpm  midodrine. 10 milliGRAM(s) Oral three times a day PRN SBP <100  ondansetron Injectable 4 milliGRAM(s) IV Push every 8 hours PRN Nausea and/or Vomiting

## 2025-07-07 NOTE — DISCHARGE NOTE NURSING/CASE MANAGEMENT/SOCIAL WORK - PATIENT PORTAL LINK FT
You can access the FollowMyHealth Patient Portal offered by [facility] by registering at the following website: [email or web address]. By joining "[employer]’s FollowMyHealth portal, you will also be able to view your health information using other applications (apps) compatible with our system.

## 2025-07-07 NOTE — DISCHARGE NOTE PROVIDER - NSDCFUSCHEDAPPT_GEN_ALL_CORE_FT
Katlin Connor  [facility]  END[address]CR[address]  Scheduled Appointment: 08/06/2025     [facility]  [facility]  [address]   Scheduled Appointment: 07/25/2025    [facility]  [facility]  [address]   Scheduled Appointment: 07/25/2025    Estela Isaacs  [facility]  [facility]  Scheduled Appointment: 07/29/2025    Katlin Connor  [facility]  [address]  Scheduled Appointment: 08/06/2025    Campbell Vaughan  [facility]  [place] [address]  Scheduled Appointment: 08/11/2025

## 2025-07-07 NOTE — CHART NOTE - NSCHARTNOTEFT_GEN_A_CORE
BIPAP on standby at this time. No distress noted.  Patient on Room Air. HR:90 RR:18 Spo2:92%.  Patient stated he has a difficult time using BIPAP at night because he uses CPAP at home with a Nasal Mask.  Spoke with Dr. Leo and patient was asked to bring his home CPAP in to use at night since the patient doesn't know his home CPAP settings.
Pt refused nocturnal BIPAP despite education. Followed up regarding his own device, pt stated that he didn't have it and refused to use ours due to his mask preference which the hospital does not have. Pt is resting comfortably and does not appear to be in any respiratory distress. V60 BIPAP remains at the bedside.

## 2025-07-07 NOTE — DISCHARGE NOTE PROVIDER - CARE PROVIDERS DIRECT ADDRESSES
,DirectAddress_Unknown,DirectAddress_Unknown,DirectAddress_Unknown ,DirectAddress_Unknown,DirectAddress_Unknown,[email or web address]

## 2025-07-08 PROBLEM — Z87.891 FORMER CIGARETTE SMOKER: Status: ACTIVE | Noted: 2025-07-08

## 2025-07-08 LAB
GAMMA INTERFERON BACKGROUND BLD IA-ACNC: 0.02 IU/ML — SIGNIFICANT CHANGE UP
M TB IFN-G BLD-IMP: ABNORMAL
M TB IFN-G CD4+ BCKGRND COR BLD-ACNC: 0 IU/ML — SIGNIFICANT CHANGE UP
M TB IFN-G CD4+CD8+ BCKGRND COR BLD-ACNC: 0 IU/ML — SIGNIFICANT CHANGE UP
QUANT TB PLUS MITOGEN MINUS NIL: 0.03 IU/ML — SIGNIFICANT CHANGE UP

## 2025-07-09 ENCOUNTER — NON-APPOINTMENT (OUTPATIENT)
Age: 60
End: 2025-07-09

## 2025-07-09 LAB — CALPROTECTIN STL-MCNT: 1550 UG/G — HIGH (ref 0–120)

## 2025-07-11 ENCOUNTER — APPOINTMENT (OUTPATIENT)
Dept: THORACIC SURGERY | Facility: CLINIC | Age: 60
End: 2025-07-11

## 2025-07-11 VITALS
HEIGHT: 67 IN | WEIGHT: 220 LBS | HEART RATE: 78 BPM | DIASTOLIC BLOOD PRESSURE: 73 MMHG | BODY MASS INDEX: 34.53 KG/M2 | OXYGEN SATURATION: 96 % | SYSTOLIC BLOOD PRESSURE: 130 MMHG

## 2025-07-11 PROCEDURE — 99205 OFFICE O/P NEW HI 60 MIN: CPT

## 2025-07-15 ENCOUNTER — OUTPATIENT (OUTPATIENT)
Dept: OUTPATIENT SERVICES | Facility: HOSPITAL | Age: 60
LOS: 1 days | Discharge: HOME | End: 2025-07-15

## 2025-07-15 DIAGNOSIS — K46.9 UNSPECIFIED ABDOMINAL HERNIA WITHOUT OBSTRUCTION OR GANGRENE: Chronic | ICD-10-CM

## 2025-07-15 DIAGNOSIS — Z98.89 OTHER SPECIFIED POSTPROCEDURAL STATES: Chronic | ICD-10-CM

## 2025-07-15 DIAGNOSIS — C34.90 MALIGNANT NEOPLASM OF UNSPECIFIED PART OF UNSPECIFIED BRONCHUS OR LUNG: ICD-10-CM

## 2025-07-15 DIAGNOSIS — Z98.890 OTHER SPECIFIED POSTPROCEDURAL STATES: Chronic | ICD-10-CM

## 2025-07-15 DIAGNOSIS — Z95.818 PRESENCE OF OTHER CARDIAC IMPLANTS AND GRAFTS: Chronic | ICD-10-CM

## 2025-07-16 ENCOUNTER — APPOINTMENT (OUTPATIENT)
Dept: HEMATOLOGY ONCOLOGY | Facility: CLINIC | Age: 60
End: 2025-07-16

## 2025-07-16 ENCOUNTER — RESULT REVIEW (OUTPATIENT)
Age: 60
End: 2025-07-16

## 2025-07-16 LAB
ALBUMIN SERPL ELPH-MCNC: 4.2 G/DL
ALP BLD-CCNC: 62 U/L
ALT SERPL-CCNC: 41 U/L
ANION GAP SERPL CALC-SCNC: 17 MMOL/L
AST SERPL-CCNC: 19 U/L
BASOPHILS # BLD AUTO: 0.05 K/UL — SIGNIFICANT CHANGE UP (ref 0–0.2)
BASOPHILS NFR BLD AUTO: 0.3 % — SIGNIFICANT CHANGE UP (ref 0–2)
BILIRUB SERPL-MCNC: 0.6 MG/DL
BUN SERPL-MCNC: 21 MG/DL
CALCIUM SERPL-MCNC: 9.6 MG/DL
CEA SERPL-MCNC: 1.4 NG/ML
CHLORIDE SERPL-SCNC: 102 MMOL/L
CO2 SERPL-SCNC: 22 MMOL/L
CREAT SERPL-MCNC: 0.89 MG/DL
EGFRCR SERPLBLD CKD-EPI 2021: 98 ML/MIN/1.73M2
EOSINOPHIL # BLD AUTO: 0.47 K/UL — SIGNIFICANT CHANGE UP (ref 0–0.5)
EOSINOPHIL NFR BLD AUTO: 2.9 % — SIGNIFICANT CHANGE UP (ref 0–6)
GLUCOSE SERPL-MCNC: 140 MG/DL
HCT VFR BLD CALC: 31.6 % — LOW (ref 39–50)
HGB BLD-MCNC: 9.5 G/DL — LOW (ref 13–17)
IMM GRANULOCYTES # BLD AUTO: 0.11 K/UL — HIGH (ref 0–0.07)
IMM GRANULOCYTES NFR BLD AUTO: 0.7 % — SIGNIFICANT CHANGE UP (ref 0–0.9)
LYMPHOCYTES # BLD AUTO: 1.81 K/UL — SIGNIFICANT CHANGE UP (ref 1–3.3)
LYMPHOCYTES NFR BLD AUTO: 11.2 % — LOW (ref 13–44)
MCHC RBC-ENTMCNC: 24.7 PG — LOW (ref 27–34)
MCHC RBC-ENTMCNC: 30.1 G/DL — LOW (ref 32–36)
MCV RBC AUTO: 82.3 FL — SIGNIFICANT CHANGE UP (ref 80–100)
MONOCYTES # BLD AUTO: 1.59 K/UL — HIGH (ref 0–0.9)
MONOCYTES NFR BLD AUTO: 9.8 % — SIGNIFICANT CHANGE UP (ref 2–14)
NEUTROPHILS # BLD AUTO: 12.2 K/UL — HIGH (ref 1.8–7.4)
NEUTROPHILS NFR BLD AUTO: 75.1 % — SIGNIFICANT CHANGE UP (ref 43–77)
NRBC # BLD AUTO: 0 K/UL — SIGNIFICANT CHANGE UP (ref 0–0)
NRBC # FLD: 0 K/UL — SIGNIFICANT CHANGE UP (ref 0–0)
NRBC BLD AUTO-RTO: 0 /100 WBCS — SIGNIFICANT CHANGE UP (ref 0–0)
PLATELET # BLD AUTO: 793 K/UL — HIGH (ref 150–400)
PMV BLD: 8.6 FL — SIGNIFICANT CHANGE UP (ref 7–13)
POTASSIUM SERPL-SCNC: 4.2 MMOL/L
PROT SERPL-MCNC: 7.8 G/DL
RBC # BLD: 3.84 M/UL — LOW (ref 4.2–5.8)
RBC # FLD: 17.2 % — HIGH (ref 10.3–14.5)
SODIUM SERPL-SCNC: 141 MMOL/L
WBC # BLD: 16.23 K/UL — HIGH (ref 3.8–10.5)
WBC # FLD AUTO: 16.23 K/UL — HIGH (ref 3.8–10.5)

## 2025-07-17 LAB
HBV CORE IGG+IGM SER QL: NONREACTIVE
HBV SURFACE AB SER QL: NONREACTIVE
HBV SURFACE AG SER QL: NONREACTIVE
HCV AB SER QL: NONREACTIVE
HCV S/CO RATIO: 0.08 S/CO

## 2025-07-23 ENCOUNTER — AMBULATORY SURGERY CENTER (OUTPATIENT)
Dept: URBAN - METROPOLITAN AREA SURGERY 4 | Facility: SURGERY | Age: 60
Setting detail: OPHTHALMOLOGY
End: 2025-07-23
Payer: COMMERCIAL

## 2025-07-23 DIAGNOSIS — H52.221: ICD-10-CM

## 2025-07-23 DIAGNOSIS — H25.11: ICD-10-CM

## 2025-07-23 PROCEDURE — 66984 XCAPSL CTRC RMVL W/O ECP: CPT | Mod: RT | Performed by: STUDENT IN AN ORGANIZED HEALTH CARE EDUCATION/TRAINING PROGRAM

## 2025-07-23 PROCEDURE — FEMTO PRECISION LASER CATARACT SURGERY: Mod: GY | Performed by: STUDENT IN AN ORGANIZED HEALTH CARE EDUCATION/TRAINING PROGRAM

## 2025-07-24 ENCOUNTER — OFFICE (OUTPATIENT)
Dept: URBAN - METROPOLITAN AREA CLINIC 105 | Facility: CLINIC | Age: 60
Setting detail: OPHTHALMOLOGY
End: 2025-07-24
Payer: COMMERCIAL

## 2025-07-24 DIAGNOSIS — Z96.1: ICD-10-CM

## 2025-07-24 PROCEDURE — 99024 POSTOP FOLLOW-UP VISIT: CPT | Performed by: STUDENT IN AN ORGANIZED HEALTH CARE EDUCATION/TRAINING PROGRAM

## 2025-07-24 ASSESSMENT — CONFRONTATIONAL VISUAL FIELD TEST (CVF)
OD_FINDINGS: FULL
OS_FINDINGS: FULL

## 2025-07-24 ASSESSMENT — REFRACTION_AUTOREFRACTION
OS_SPHERE: +0.50
OS_CYLINDER: -0.25
OD_CYLINDER: -1.25
OS_AXIS: 171
OD_SPHERE: +1.00
OD_AXIS: 015

## 2025-07-24 ASSESSMENT — KERATOMETRY
OD_K2POWER_DIOPTERS: 43.25
OD_K1POWER_DIOPTERS: 40.25
OS_K2POWER_DIOPTERS: 42.75
OS_AXISANGLE_DEGREES: 093
OS_K1POWER_DIOPTERS: 40.50
OD_AXISANGLE_DEGREES: 094

## 2025-07-24 ASSESSMENT — TONOMETRY: OD_IOP_MMHG: 21

## 2025-07-24 ASSESSMENT — VISUAL ACUITY
OD_BCVA: 20/30+1
OS_BCVA: 20/20-1

## 2025-07-24 ASSESSMENT — CORNEAL EDEMA CLINICAL DESCRIPTION: OD_CORNEALEDEMA: 1+ 2+

## 2025-07-25 ENCOUNTER — APPOINTMENT (OUTPATIENT)
Dept: NUCLEAR MEDICINE | Facility: CLINIC | Age: 60
End: 2025-07-25
Payer: MEDICARE

## 2025-07-25 ENCOUNTER — OUTPATIENT (OUTPATIENT)
Dept: OUTPATIENT SERVICES | Facility: HOSPITAL | Age: 60
LOS: 1 days | End: 2025-07-25

## 2025-07-25 DIAGNOSIS — Z95.818 PRESENCE OF OTHER CARDIAC IMPLANTS AND GRAFTS: Chronic | ICD-10-CM

## 2025-07-25 DIAGNOSIS — Z98.890 OTHER SPECIFIED POSTPROCEDURAL STATES: Chronic | ICD-10-CM

## 2025-07-25 DIAGNOSIS — Z98.89 OTHER SPECIFIED POSTPROCEDURAL STATES: Chronic | ICD-10-CM

## 2025-07-25 DIAGNOSIS — K46.9 UNSPECIFIED ABDOMINAL HERNIA WITHOUT OBSTRUCTION OR GANGRENE: Chronic | ICD-10-CM

## 2025-07-25 DIAGNOSIS — K86.9 DISEASE OF PANCREAS, UNSPECIFIED: ICD-10-CM

## 2025-07-25 PROCEDURE — 78815 PET IMAGE W/CT SKULL-THIGH: CPT | Mod: 26,PI

## 2025-07-28 ENCOUNTER — NON-APPOINTMENT (OUTPATIENT)
Age: 60
End: 2025-07-28

## 2025-07-29 ENCOUNTER — APPOINTMENT (OUTPATIENT)
Dept: HEMATOLOGY ONCOLOGY | Facility: CLINIC | Age: 60
End: 2025-07-29

## 2025-07-30 ENCOUNTER — RX ONLY (RX ONLY)
Age: 60
End: 2025-07-30

## 2025-07-30 ENCOUNTER — OFFICE (OUTPATIENT)
Dept: URBAN - METROPOLITAN AREA CLINIC 113 | Facility: CLINIC | Age: 60
Setting detail: OPHTHALMOLOGY
End: 2025-07-30
Payer: COMMERCIAL

## 2025-07-30 DIAGNOSIS — H25.12: ICD-10-CM

## 2025-07-30 DIAGNOSIS — Z96.1: ICD-10-CM

## 2025-07-30 PROCEDURE — 99024 POSTOP FOLLOW-UP VISIT: CPT | Performed by: STUDENT IN AN ORGANIZED HEALTH CARE EDUCATION/TRAINING PROGRAM

## 2025-07-30 PROCEDURE — 92136 OPHTHALMIC BIOMETRY: CPT | Performed by: STUDENT IN AN ORGANIZED HEALTH CARE EDUCATION/TRAINING PROGRAM

## 2025-07-30 ASSESSMENT — CORNEAL EDEMA CLINICAL DESCRIPTION: OD_CORNEALEDEMA: 1+

## 2025-07-30 ASSESSMENT — KERATOMETRY
OS_AXISANGLE_DEGREES: 093
OD_K1POWER_DIOPTERS: 40.25
OD_K2POWER_DIOPTERS: 41.75
OS_K2POWER_DIOPTERS: 41.75
OD_AXISANGLE_DEGREES: 105
OS_K1POWER_DIOPTERS: 40.50

## 2025-07-30 ASSESSMENT — VISUAL ACUITY
OD_BCVA: 20/25+
OS_BCVA: 20/20-

## 2025-07-30 ASSESSMENT — TONOMETRY: OD_IOP_MMHG: 21

## 2025-07-30 ASSESSMENT — CONFRONTATIONAL VISUAL FIELD TEST (CVF)
OD_FINDINGS: FULL
OS_FINDINGS: FULL

## 2025-07-30 ASSESSMENT — REFRACTION_AUTOREFRACTION
OS_SPHERE: +0.75
OS_AXIS: 010
OS_CYLINDER: -0.25
OD_AXIS: 020
OD_SPHERE: +1.25
OD_CYLINDER: -0.75

## 2025-08-06 ENCOUNTER — APPOINTMENT (OUTPATIENT)
Dept: ENDOCRINOLOGY | Facility: CLINIC | Age: 60
End: 2025-08-06

## 2025-08-06 PROBLEM — R91.1 LUNG NODULE SEEN ON IMAGING STUDY: Status: ACTIVE | Noted: 2025-07-08

## 2025-08-11 ENCOUNTER — APPOINTMENT (OUTPATIENT)
Dept: THORACIC SURGERY | Facility: CLINIC | Age: 60
End: 2025-08-11

## 2025-08-11 DIAGNOSIS — R91.1 SOLITARY PULMONARY NODULE: ICD-10-CM

## (undated) DEVICE — NDL ENDO ASPIRATION SLIMLINE HDL 25G

## (undated) DEVICE — DRSG 2X2

## (undated) DEVICE — TUBING IV EXTENSION MACRO W CLAVE 7"

## (undated) DEVICE — SOL IRR BAG H2O 1000ML

## (undated) DEVICE — TUBING ALARIS PUMP MODULE NON-DEHP

## (undated) DEVICE — CATH IV SAFE BC 22G X 1" (BLUE)

## (undated) DEVICE — GOWN IMPERV XL

## (undated) DEVICE — DENTURE CUP PINK

## (undated) DEVICE — SSH-EUS LINEAR 7310003: Type: DURABLE MEDICAL EQUIPMENT

## (undated) DEVICE — SENSOR O2 FINGER ADULT

## (undated) DEVICE — SYR LUER SLIP TIP 50CC

## (undated) DEVICE — WARMING BLANKET FULL ADULT

## (undated) DEVICE — UNDERPAD LINEN SAVER 23 X 36"

## (undated) DEVICE — SOL IRR BAG NS 0.9% 1000ML

## (undated) DEVICE — PACK IV START WITH CHG

## (undated) DEVICE — VENODYNE/SCD SLEEVE CALF MEDIUM

## (undated) DEVICE — BASIN EMESIS 10IN GRADUATED MAUVE

## (undated) DEVICE — SYR IV FLUSH SALINE 10ML 30/TY

## (undated) DEVICE — SYR SLIP 10CC

## (undated) DEVICE — MASK PROCED EARLOOP 50/BX LRC COVID ADD

## (undated) DEVICE — SOL BAG NS 0.9% 1000ML

## (undated) DEVICE — DVC CLEVERLOCK

## (undated) DEVICE — OMNIPAQUE 300  30ML

## (undated) DEVICE — DRSG CURITY GAUZE SPONGE 4 X 4" 12-PLY NON-STERILE

## (undated) DEVICE — FORCEP RADIAL JAW 4 W NDL 2.4MM 2.8MM 240CM ORANGE DISP